# Patient Record
Sex: FEMALE | Race: WHITE | Employment: FULL TIME | URBAN - METROPOLITAN AREA
[De-identification: names, ages, dates, MRNs, and addresses within clinical notes are randomized per-mention and may not be internally consistent; named-entity substitution may affect disease eponyms.]

---

## 2020-01-10 ENCOUNTER — HOSPITAL ENCOUNTER (OUTPATIENT)
Age: 60
Setting detail: SPECIMEN
Discharge: HOME | End: 2020-01-10
Payer: COMMERCIAL

## 2020-01-10 ENCOUNTER — OFFICE VISIT (OUTPATIENT)
Dept: FAMILY MEDICINE CLINIC | Age: 60
End: 2020-01-10
Payer: COMMERCIAL

## 2020-01-10 VITALS
HEIGHT: 67 IN | DIASTOLIC BLOOD PRESSURE: 64 MMHG | TEMPERATURE: 98.8 F | OXYGEN SATURATION: 95 % | HEART RATE: 77 BPM | BODY MASS INDEX: 28.09 KG/M2 | SYSTOLIC BLOOD PRESSURE: 118 MMHG | WEIGHT: 179 LBS

## 2020-01-10 LAB
ALBUMIN SERPL-MCNC: 4.3 G/DL (ref 3.5–5.2)
ALBUMIN/GLOBULIN RATIO: 1.3 (ref 1–2.5)
ALP BLD-CCNC: 69 U/L (ref 35–104)
ALT SERPL-CCNC: 5 U/L (ref 5–33)
ANION GAP SERPL CALCULATED.3IONS-SCNC: 16 MMOL/L (ref 9–17)
AST SERPL-CCNC: 15 U/L
BILIRUB SERPL-MCNC: 0.4 MG/DL (ref 0.3–1.2)
BUN BLDV-MCNC: 19 MG/DL (ref 6–20)
BUN/CREAT BLD: NORMAL (ref 9–20)
CALCIUM SERPL-MCNC: 9.2 MG/DL (ref 8.6–10.4)
CHLORIDE BLD-SCNC: 103 MMOL/L (ref 98–107)
CHOLESTEROL, FASTING: 159 MG/DL
CHOLESTEROL/HDL RATIO: 2
CO2: 24 MMOL/L (ref 20–31)
CREAT SERPL-MCNC: 0.52 MG/DL (ref 0.5–0.9)
GFR AFRICAN AMERICAN: >60 ML/MIN
GFR NON-AFRICAN AMERICAN: >60 ML/MIN
GFR SERPL CREATININE-BSD FRML MDRD: NORMAL ML/MIN/{1.73_M2}
GFR SERPL CREATININE-BSD FRML MDRD: NORMAL ML/MIN/{1.73_M2}
GLUCOSE BLD-MCNC: 83 MG/DL (ref 70–99)
HCT VFR BLD CALC: 42.8 % (ref 36.3–47.1)
HDLC SERPL-MCNC: 79 MG/DL
HEMOGLOBIN: 14.2 G/DL (ref 11.9–15.1)
LDL CHOLESTEROL: 72 MG/DL (ref 0–130)
MCH RBC QN AUTO: 32.1 PG (ref 25.2–33.5)
MCHC RBC AUTO-ENTMCNC: 33.2 G/DL (ref 28.4–34.8)
MCV RBC AUTO: 96.8 FL (ref 82.6–102.9)
NRBC AUTOMATED: 0 PER 100 WBC
PDW BLD-RTO: 12.5 % (ref 11.8–14.4)
PLATELET # BLD: 251 K/UL (ref 138–453)
PMV BLD AUTO: 10.2 FL (ref 8.1–13.5)
POTASSIUM SERPL-SCNC: 4.1 MMOL/L (ref 3.7–5.3)
RBC # BLD: 4.42 M/UL (ref 3.95–5.11)
SODIUM BLD-SCNC: 143 MMOL/L (ref 135–144)
THYROXINE, FREE: 1.43 NG/DL (ref 0.93–1.7)
TOTAL PROTEIN: 7.5 G/DL (ref 6.4–8.3)
TRIGLYCERIDE, FASTING: 40 MG/DL
TSH SERPL DL<=0.05 MIU/L-ACNC: 0.64 MIU/L (ref 0.3–5)
VLDLC SERPL CALC-MCNC: NORMAL MG/DL (ref 1–30)
WBC # BLD: 6 K/UL (ref 3.5–11.3)

## 2020-01-10 PROCEDURE — 99396 PREV VISIT EST AGE 40-64: CPT | Performed by: NURSE PRACTITIONER

## 2020-01-10 SDOH — ECONOMIC STABILITY: TRANSPORTATION INSECURITY
IN THE PAST 12 MONTHS, HAS LACK OF TRANSPORTATION KEPT YOU FROM MEETINGS, WORK, OR FROM GETTING THINGS NEEDED FOR DAILY LIVING?: PATIENT DECLINED

## 2020-01-10 SDOH — ECONOMIC STABILITY: FOOD INSECURITY: WITHIN THE PAST 12 MONTHS, YOU WORRIED THAT YOUR FOOD WOULD RUN OUT BEFORE YOU GOT MONEY TO BUY MORE.: PATIENT DECLINED

## 2020-01-10 SDOH — ECONOMIC STABILITY: FOOD INSECURITY: WITHIN THE PAST 12 MONTHS, THE FOOD YOU BOUGHT JUST DIDN'T LAST AND YOU DIDN'T HAVE MONEY TO GET MORE.: PATIENT DECLINED

## 2020-01-10 SDOH — ECONOMIC STABILITY: TRANSPORTATION INSECURITY
IN THE PAST 12 MONTHS, HAS THE LACK OF TRANSPORTATION KEPT YOU FROM MEDICAL APPOINTMENTS OR FROM GETTING MEDICATIONS?: PATIENT DECLINED

## 2020-01-10 SDOH — ECONOMIC STABILITY: INCOME INSECURITY: HOW HARD IS IT FOR YOU TO PAY FOR THE VERY BASICS LIKE FOOD, HOUSING, MEDICAL CARE, AND HEATING?: PATIENT DECLINED

## 2020-01-10 ASSESSMENT — PATIENT HEALTH QUESTIONNAIRE - PHQ9
2. FEELING DOWN, DEPRESSED OR HOPELESS: 0
SUM OF ALL RESPONSES TO PHQ QUESTIONS 1-9: 0
1. LITTLE INTEREST OR PLEASURE IN DOING THINGS: 0
SUM OF ALL RESPONSES TO PHQ QUESTIONS 1-9: 0
SUM OF ALL RESPONSES TO PHQ9 QUESTIONS 1 & 2: 0

## 2020-01-10 NOTE — PROGRESS NOTES
GRIS Puentes-MANSI  [address]  [address] [facility]. Sudha Hinds 79  [phone]  [phone]    Alessandra Hu is a 63 y.o. female who is here with c/o of:    Chief Complaint: Check-Up (was Dr. Pantera Wu patient )      Patient Accompanied by:     ALEX - Alessandra Hu is here today to establish care and has no complaints          There is no problem list on file for this patient. No past medical history on file. No past surgical history on file. No family history on file. Social History     Tobacco Use    Smoking status: Former Smoker     Packs/day: 1.00     Years: 0.00     Pack years: 0.00     Start date: 2/24/1989    Smokeless tobacco: Never Used   Substance Use Topics    Alcohol use: Not on file     ALLERGIES:  No Known Allergies       Subjective     · Constitutional:  Negative for activity change, appetite change,unexpected weight change, chills, fever, and fatigue. · HENT: Negative for ear pain, sore throat,  Rhinorrhea, sinus pain, sinus pressure, congestion. · Eyes:  Negative for pain and discharge. · Respiratory:  Negative for chest tightness, shortness of breath, wheezing, and cough. · Cardiovascular:  Negative for chest pain, palpitations and leg swelling. · Gastrointestinal: Negative for abdominal pain, blood in stool, constipation,diarrhea, nausea and vomiting. · Endocrine: Negative for cold intolerance, heat intolerance, polydipsia, polyphagia and polyuria. · Genitourinary: Negative for difficulty urinating, dysuria, flank pain, frequency, hematuria and urgency. · Musculoskeletal: Negative for arthralgias, back pain, joint swelling, myalgias, neck pain and neck stiffness. · Skin: Negative for rash and wound. · Allergic/Immunologic: Negative for environmental allergies and food allergies. · Neurological:  Negative for dizziness, light-headedness, numbness and headaches. · Hematological:  Negative for adenopathy.  Does not 118/64, pulse 77, temperature 98.8 °F (37.1 °C), temperature source Temporal, height 5' 7\" (1.702 m), weight 179 lb (81.2 kg), SpO2 95 %. Body mass index is 28.04 kg/m². Wt Readings from Last 3 Encounters:   01/10/20 179 lb (81.2 kg)   02/24/15 189 lb (85.7 kg)   12/03/14 189 lb 6.4 oz (85.9 kg)     BP Readings from Last 3 Encounters:   01/10/20 118/64   02/24/15 132/70   12/03/14 116/80       No results found for this visit on 01/10/20. Completed Orders/Prescriptions   No orders of the defined types were placed in this encounter. AssessmentPlan/Medical Decision Making     1. Encounter for well adult exam without abnormal findings  - CBC; Future  - Comprehensive Metabolic Panel; Future    2. Screening cholesterol level  - Lipid, Fasting; Future    3. Thyroid disorder screening  - TSH; Future  - T4, Free; Future    4. Encounter to establish care with new doctor      Return in about 1 year (around 1/10/2021) for wellness check. 1.  Diaz Edwards received counseling on the following healthy behaviors: nutrition, exercise and medication adherence  2. Patient given educational materials - see patient instructions  3. Was a self-tracking handout given in paper form or via [employer]? No  If yes, see orders or list here. 4.  Discussed use, benefit, and side effects of prescribed medications. Barriers to medication compliance addressed. All patient questions answered. Pt voiced understanding. 5.  Reviewed prior labs, imaging, consultation, follow up, and health maintenance  6. Continue current medications, diet and exercise. 7. Discussed use, benefit, and side effects of prescribed medications. Barriers to medication compliance addressed. All her questions were answered. Pt voiced understanding. Diaz Edwards will continue current medications, diet and exercise.     Patient given educational materials on healthy diet and exercise    Of the 30 minute duration appointment visit, Mercy Salguero CNP spent at least 50% of the face-to-face time in counseling, explanation of diagnosis, planning of further management, and answering all questions. Signed:  Juan Morales CNP    This note is created with the assistance of a speech-recognition program.  While intending to generate a document that actually reflects the content of the visit, no guarantees can be provided that every mistake has been identified and corrected by editing.

## 2020-10-14 ENCOUNTER — NURSE ONLY (OUTPATIENT)
Dept: FAMILY MEDICINE CLINIC | Age: 60
End: 2020-10-14

## 2023-07-13 DIAGNOSIS — I10 PRIMARY HYPERTENSION: Primary | ICD-10-CM

## 2023-07-13 DIAGNOSIS — K21.9 GASTROESOPHAGEAL REFLUX DISEASE WITHOUT ESOPHAGITIS: ICD-10-CM

## 2023-07-13 RX ORDER — AMLODIPINE BESYLATE 5 MG/1
5 TABLET ORAL DAILY
COMMUNITY
Start: 2023-04-15 | End: 2023-07-17 | Stop reason: SDUPTHER

## 2023-07-17 PROBLEM — K21.9 GASTROESOPHAGEAL REFLUX DISEASE WITHOUT ESOPHAGITIS: Status: ACTIVE | Noted: 2023-07-17

## 2023-07-17 RX ORDER — MULTIVIT-MIN/IRON/FOLIC ACID/K 18-600-40
2 CAPSULE ORAL DAILY
COMMUNITY

## 2023-07-17 RX ORDER — ALBUTEROL SULFATE 90 UG/1
2 AEROSOL, METERED RESPIRATORY (INHALATION) EVERY 6 HOURS PRN
COMMUNITY

## 2023-07-17 RX ORDER — AMLODIPINE BESYLATE 5 MG/1
5 TABLET ORAL DAILY
Qty: 30 TABLET | Refills: 2 | Status: SHIPPED | OUTPATIENT
Start: 2023-07-17 | End: 2023-10-15

## 2023-07-17 RX ORDER — LISINOPRIL 40 MG/1
40 TABLET ORAL DAILY
COMMUNITY
Start: 2023-07-10

## 2023-07-17 RX ORDER — UBIDECARENONE 30 MG
1 CAPSULE ORAL DAILY
COMMUNITY

## 2023-07-17 RX ORDER — OMEPRAZOLE 20 MG/1
20 CAPSULE, DELAYED RELEASE ORAL
COMMUNITY
Start: 2023-06-14

## 2023-08-21 ENCOUNTER — OFFICE VISIT (OUTPATIENT)
Age: 63
End: 2023-08-21
Payer: COMMERCIAL

## 2023-08-21 VITALS
DIASTOLIC BLOOD PRESSURE: 56 MMHG | HEART RATE: 72 BPM | BODY MASS INDEX: 30.92 KG/M2 | SYSTOLIC BLOOD PRESSURE: 132 MMHG | RESPIRATION RATE: 12 BRPM | WEIGHT: 197.4 LBS

## 2023-08-21 DIAGNOSIS — M25.561 CHRONIC PAIN OF BOTH KNEES: ICD-10-CM

## 2023-08-21 DIAGNOSIS — E66.09 CLASS 1 OBESITY DUE TO EXCESS CALORIES WITH SERIOUS COMORBIDITY AND BODY MASS INDEX (BMI) OF 30.0 TO 30.9 IN ADULT: ICD-10-CM

## 2023-08-21 DIAGNOSIS — M70.72 BURSITIS OF BOTH HIPS, UNSPECIFIED BURSA: ICD-10-CM

## 2023-08-21 DIAGNOSIS — M25.562 CHRONIC PAIN OF BOTH KNEES: ICD-10-CM

## 2023-08-21 DIAGNOSIS — I10 ESSENTIAL HYPERTENSION: Primary | ICD-10-CM

## 2023-08-21 DIAGNOSIS — G89.29 CHRONIC PAIN OF BOTH KNEES: ICD-10-CM

## 2023-08-21 DIAGNOSIS — M70.71 BURSITIS OF BOTH HIPS, UNSPECIFIED BURSA: ICD-10-CM

## 2023-08-21 DIAGNOSIS — Z87.891 FORMER SMOKER, STOPPED SMOKING IN DISTANT PAST: ICD-10-CM

## 2023-08-21 PROBLEM — G47.33 OSA (OBSTRUCTIVE SLEEP APNEA): Status: ACTIVE | Noted: 2023-08-21

## 2023-08-21 PROBLEM — G47.33 OSA (OBSTRUCTIVE SLEEP APNEA): Status: ACTIVE | Noted: 2023-03-02

## 2023-08-21 PROCEDURE — 3074F SYST BP LT 130 MM HG: CPT | Performed by: STUDENT IN AN ORGANIZED HEALTH CARE EDUCATION/TRAINING PROGRAM

## 2023-08-21 PROCEDURE — 3078F DIAST BP <80 MM HG: CPT | Performed by: STUDENT IN AN ORGANIZED HEALTH CARE EDUCATION/TRAINING PROGRAM

## 2023-08-21 PROCEDURE — 99213 OFFICE O/P EST LOW 20 MIN: CPT | Performed by: STUDENT IN AN ORGANIZED HEALTH CARE EDUCATION/TRAINING PROGRAM

## 2023-08-21 RX ORDER — ACETAMINOPHEN 160 MG
1 TABLET,DISINTEGRATING ORAL DAILY
COMMUNITY

## 2023-08-21 SDOH — ECONOMIC STABILITY: FOOD INSECURITY: WITHIN THE PAST 12 MONTHS, THE FOOD YOU BOUGHT JUST DIDN'T LAST AND YOU DIDN'T HAVE MONEY TO GET MORE.: NEVER TRUE

## 2023-08-21 SDOH — ECONOMIC STABILITY: FOOD INSECURITY: WITHIN THE PAST 12 MONTHS, YOU WORRIED THAT YOUR FOOD WOULD RUN OUT BEFORE YOU GOT MONEY TO BUY MORE.: NEVER TRUE

## 2023-08-21 SDOH — ECONOMIC STABILITY: HOUSING INSECURITY
IN THE LAST 12 MONTHS, WAS THERE A TIME WHEN YOU DID NOT HAVE A STEADY PLACE TO SLEEP OR SLEPT IN A SHELTER (INCLUDING NOW)?: NO

## 2023-08-21 SDOH — ECONOMIC STABILITY: INCOME INSECURITY: HOW HARD IS IT FOR YOU TO PAY FOR THE VERY BASICS LIKE FOOD, HOUSING, MEDICAL CARE, AND HEATING?: NOT HARD AT ALL

## 2023-08-21 ASSESSMENT — PATIENT HEALTH QUESTIONNAIRE - PHQ9
2. FEELING DOWN, DEPRESSED OR HOPELESS: 0
SUM OF ALL RESPONSES TO PHQ QUESTIONS 1-9: 0
SUM OF ALL RESPONSES TO PHQ9 QUESTIONS 1 & 2: 0
1. LITTLE INTEREST OR PLEASURE IN DOING THINGS: 0
SUM OF ALL RESPONSES TO PHQ QUESTIONS 1-9: 0

## 2023-08-21 NOTE — PROGRESS NOTES
Neurological:      Mental Status: She is alert. LABS   No labs since 2020 in epic. No results found for this visit on 08/21/23. COMMUNICATION   Questions/concerns answered. Patient verbalized and expressed understanding. Medications, laboratory testing, imaging, consultation, and follow up as documented in this encounter. Patient Instructions   Thank you for following up with us at [address] [place] [address]. office! It was a pleasure to meet you today! Our plan is the following:  - I want you to use over the counter Tylenol Extra strength (the arthritis strength one), or any generic arthritis compound at the drugstore. You can take 650mg in the morning and 650mg in the evening.     - I'm going to give you some stretches to do to help loosen the muscles in the front of your thighs. If you can only stretch once a day, make sure that one time is before bed. Your medication list is included in the after visit summary. If you find any differences when compared to your medications at home, please contact the office ([phone]) to let us know. You can also call the office if you have any additional questions or concerns and speak to one of the staff. They will triage and forward the message to the provider. Have a great rest of your day! BMI was elevated today, and weight loss plan recommended is : daily exercise regimen.     Please be aware portions of this note were completed using voice recognition software and unforeseen errors may have occurred    Patient was seen and discussed with Domenica Grant MD.  Electronically signed by Jeane Toribio MD on 8/21/2023 at 12:30 PM

## 2023-08-21 NOTE — PATIENT INSTRUCTIONS
Thank you for following up with us at [address] [facility]. office! It was a pleasure to meet you today! Our plan is the following:  - I want you to use over the counter Tylenol Extra strength (the arthritis strength one), or any generic arthritis compound at the drugstore. You can take 650mg in the morning and 650mg in the evening.     - I'm going to give you some stretches to do to help loosen the muscles in the front of your thighs. If you can only stretch once a day, make sure that one time is before bed. Your medication list is included in the after visit summary. If you find any differences when compared to your medications at home, please contact the office ([phone]) to let us know. You can also call the office if you have any additional questions or concerns and speak to one of the staff. They will triage and forward the message to the provider. Have a great rest of your day!

## 2023-10-25 DIAGNOSIS — I10 PRIMARY HYPERTENSION: ICD-10-CM

## 2023-10-25 RX ORDER — AMLODIPINE BESYLATE 5 MG/1
5 TABLET ORAL DAILY
Qty: 90 TABLET | Refills: 3 | Status: SHIPPED | OUTPATIENT
Start: 2023-10-25 | End: 2024-10-19

## 2023-11-01 ENCOUNTER — OFFICE VISIT (OUTPATIENT)
Age: 63
End: 2023-11-01
Payer: COMMERCIAL

## 2023-11-01 ENCOUNTER — HOSPITAL ENCOUNTER (OUTPATIENT)
Age: 63
Setting detail: SPECIMEN
Discharge: HOME | End: 2023-11-01

## 2023-11-01 VITALS
DIASTOLIC BLOOD PRESSURE: 55 MMHG | BODY MASS INDEX: 31.04 KG/M2 | HEART RATE: 80 BPM | WEIGHT: 198.2 LBS | RESPIRATION RATE: 14 BRPM | SYSTOLIC BLOOD PRESSURE: 136 MMHG

## 2023-11-01 DIAGNOSIS — Z12.11 COLON CANCER SCREENING: ICD-10-CM

## 2023-11-01 DIAGNOSIS — Z23 FLU VACCINE NEED: ICD-10-CM

## 2023-11-01 DIAGNOSIS — Z12.31 ENCOUNTER FOR SCREENING MAMMOGRAM FOR BREAST CANCER: ICD-10-CM

## 2023-11-01 DIAGNOSIS — Z87.891 FORMER SMOKER, STOPPED SMOKING IN DISTANT PAST: ICD-10-CM

## 2023-11-01 DIAGNOSIS — Z12.4 CERVICAL CANCER SCREENING: ICD-10-CM

## 2023-11-01 DIAGNOSIS — Z01.419 ENCOUNTER FOR WELL WOMAN EXAM WITH ROUTINE GYNECOLOGICAL EXAM: Primary | ICD-10-CM

## 2023-11-01 PROCEDURE — 3078F DIAST BP <80 MM HG: CPT | Performed by: STUDENT IN AN ORGANIZED HEALTH CARE EDUCATION/TRAINING PROGRAM

## 2023-11-01 PROCEDURE — 3074F SYST BP LT 130 MM HG: CPT | Performed by: STUDENT IN AN ORGANIZED HEALTH CARE EDUCATION/TRAINING PROGRAM

## 2023-11-01 PROCEDURE — 90686 IIV4 VACC NO PRSV 0.5 ML IM: CPT | Performed by: FAMILY MEDICINE

## 2023-11-01 PROCEDURE — 99211 OFF/OP EST MAY X REQ PHY/QHP: CPT | Performed by: STUDENT IN AN ORGANIZED HEALTH CARE EDUCATION/TRAINING PROGRAM

## 2023-11-01 PROCEDURE — 99396 PREV VISIT EST AGE 40-64: CPT | Performed by: STUDENT IN AN ORGANIZED HEALTH CARE EDUCATION/TRAINING PROGRAM

## 2023-11-01 NOTE — PROGRESS NOTES
[address] [facility]. S.W Family Medicine Residency  [address] [facility], [facility]  Phone: [phone]  Fax: [phone]    Date of Visit: 23  Patient Name: Janelle Reeves   Patient :  1960     ASSESSMENT/PLAN   Janelle Reeves is a 59 y.o. female who is here for a complete physical exam. Preventative screenings reviewed with patient today. Patient is due for her flu shot, a mammogram, a colonoscopy, and a pap smear today. Overall she is doing well. 1. Encounter for well woman exam with routine gynecological exam  Comments:  - catching up on screenings, but no concerns today  2. Cervical cancer screening  Comments:  - pt has MyChart, so will send message via [employer] if results are negative/normal  Orders:  -     PAP SMEAR; Future  3. Encounter for screening mammogram for breast cancer  -     [facility] MOR DIGITAL SCREEN BILATERAL; Future  4. Colon cancer screening  -     [facility], [facility] Mario Arevalo DO, Gastroenterology, [facility]  5. Flu vaccine need  -     Influenza, FLUARIX, (age 10 mo+),  IM, PF, 0.5 mL  6. Former smoker, stopped smoking in distant past  Assessment & Plan:  - quit smoking in  after 11 years, so does not meet criteria for LDCT       Return in about 4 months (around 3/1/2024) for routine HTN follow-up. HPI   Janelle Reeves is a 59 y.o. female for an annual wellness exam.    Subjective:  She has been feeling fine and has had no new problems since last office visit. REVIEWED INFORMATION    I have personally reviewed the medications, PMH, PSH, FH, allergies and social history.     Past Medical History:   Diagnosis Date    Former smoker     GERD (gastroesophageal reflux disease)     HTN (hypertension)     Pneumonia     Sleep apnea 2022    Wears glasses      Past Surgical History:   Procedure Laterality Date    BUNIONECTOMY Right 2015    FOOT FRACTURE SURGERY Left 1967    TONSILLECTOMY      as a child    [facility] NEEDLE ASPIRATION Right      Patient

## 2023-11-01 NOTE — PATIENT INSTRUCTIONS
Thank you for following up with us at [address] [facility]. office! It was a pleasure to meet you today! Our plan is the following:  - you'll receive a message in [employer] [facility] if your pap results come back normal. If there's anything abnormal with your results, you'll receive a call from the office.    - I have generated a referral for GI. If you have not heard from them in 4 business days, you can call the number on the order sheet and schedule an appointment to get established. If you have any issues getting scheduled, please call the office to let me know. Your medication list is included in the after visit summary. If you find any differences when compared to your medications at home, please contact the office ([phone]) to let us know. You can also call the office if you have any additional questions or concerns and speak to one of the staff. They will triage and forward the message to the provider. Have a great rest of your day!

## 2023-11-02 DIAGNOSIS — Z12.4 CERVICAL CANCER SCREENING: ICD-10-CM

## 2023-11-03 LAB
HPV I/H RISK 4 DNA CVX QL NAA+PROBE: NOT DETECTED
HPV SAMPLE: NORMAL
HPV, INTERPRETATION: NORMAL
HPV16 DNA CVX QL NAA+PROBE: NOT DETECTED
HPV18 DNA CVX QL NAA+PROBE: NOT DETECTED
SPECIMEN DESCRIPTION: NORMAL

## 2023-11-06 ENCOUNTER — HOSPITAL ENCOUNTER (OUTPATIENT)
Dept: MAMMOGRAPHY | Age: 63
Discharge: HOME | End: 2023-11-08
Payer: COMMERCIAL

## 2023-11-06 VITALS — HEIGHT: 67 IN | BODY MASS INDEX: 31.08 KG/M2 | WEIGHT: 198 LBS

## 2023-11-06 DIAGNOSIS — Z12.31 ENCOUNTER FOR SCREENING MAMMOGRAM FOR BREAST CANCER: ICD-10-CM

## 2023-11-06 PROCEDURE — 77063 BREAST TOMOSYNTHESIS BI: CPT

## 2023-11-16 LAB — CYTOLOGY REPORT: NORMAL

## 2024-01-09 DIAGNOSIS — I10 ESSENTIAL HYPERTENSION: Primary | ICD-10-CM

## 2024-01-09 DIAGNOSIS — K21.9 GASTROESOPHAGEAL REFLUX DISEASE WITHOUT ESOPHAGITIS: ICD-10-CM

## 2024-01-12 RX ORDER — LISINOPRIL 40 MG/1
40 TABLET ORAL DAILY
Qty: 90 TABLET | Refills: 1 | Status: SHIPPED | OUTPATIENT
Start: 2024-01-12

## 2024-02-21 ENCOUNTER — OFFICE VISIT (OUTPATIENT)
Age: 64
End: 2024-02-21
Payer: COMMERCIAL

## 2024-02-21 VITALS
DIASTOLIC BLOOD PRESSURE: 69 MMHG | WEIGHT: 198 LBS | HEART RATE: 76 BPM | SYSTOLIC BLOOD PRESSURE: 136 MMHG | BODY MASS INDEX: 31.01 KG/M2 | RESPIRATION RATE: 12 BRPM

## 2024-02-21 DIAGNOSIS — Z87.891 FORMER SMOKER, STOPPED SMOKING IN DISTANT PAST: ICD-10-CM

## 2024-02-21 DIAGNOSIS — R49.0 DYSPHONIA: ICD-10-CM

## 2024-02-21 DIAGNOSIS — J01.40 ACUTE NON-RECURRENT PANSINUSITIS: Primary | ICD-10-CM

## 2024-02-21 DIAGNOSIS — R09.81 NASAL CONGESTION: ICD-10-CM

## 2024-02-21 DIAGNOSIS — J34.89 RHINORRHEA: ICD-10-CM

## 2024-02-21 DIAGNOSIS — E66.09 CLASS 1 OBESITY DUE TO EXCESS CALORIES WITH SERIOUS COMORBIDITY AND BODY MASS INDEX (BMI) OF 31.0 TO 31.9 IN ADULT: ICD-10-CM

## 2024-02-21 PROCEDURE — 99211 OFF/OP EST MAY X REQ PHY/QHP: CPT | Performed by: STUDENT IN AN ORGANIZED HEALTH CARE EDUCATION/TRAINING PROGRAM

## 2024-02-21 RX ORDER — DOXYCYCLINE HYCLATE 100 MG
100 TABLET ORAL 2 TIMES DAILY
Qty: 14 TABLET | Refills: 0 | Status: SHIPPED | OUTPATIENT
Start: 2024-02-21 | End: 2024-02-28

## 2024-02-21 SDOH — SOCIAL STABILITY: SOCIAL NETWORK
DO YOU BELONG TO ANY CLUBS OR ORGANIZATIONS SUCH AS CHURCH GROUPS UNIONS, FRATERNAL OR ATHLETIC GROUPS, OR SCHOOL GROUPS?: YES

## 2024-02-21 SDOH — ECONOMIC STABILITY: INCOME INSECURITY: HOW HARD IS IT FOR YOU TO PAY FOR THE VERY BASICS LIKE FOOD, HOUSING, MEDICAL CARE, AND HEATING?: NOT HARD AT ALL

## 2024-02-21 SDOH — SOCIAL STABILITY: SOCIAL NETWORK: HOW OFTEN DO YOU GET TOGETHER WITH FRIENDS OR RELATIVES?: TWICE A WEEK

## 2024-02-21 SDOH — ECONOMIC STABILITY: INCOME INSECURITY: IN THE LAST 12 MONTHS, WAS THERE A TIME WHEN YOU WERE NOT ABLE TO PAY THE MORTGAGE OR RENT ON TIME?: NO

## 2024-02-21 SDOH — SOCIAL STABILITY: SOCIAL INSECURITY
WITHIN THE LAST YEAR, HAVE YOU BEEN KICKED, HIT, SLAPPED, OR OTHERWISE PHYSICALLY HURT BY YOUR PARTNER OR EX-PARTNER?: NO

## 2024-02-21 SDOH — ECONOMIC STABILITY: TRANSPORTATION INSECURITY
IN THE PAST 12 MONTHS, HAS THE LACK OF TRANSPORTATION KEPT YOU FROM MEDICAL APPOINTMENTS OR FROM GETTING MEDICATIONS?: NO

## 2024-02-21 SDOH — ECONOMIC STABILITY: HOUSING INSECURITY: IN THE LAST 12 MONTHS, HOW MANY PLACES HAVE YOU LIVED?: 1

## 2024-02-21 SDOH — SOCIAL STABILITY: SOCIAL NETWORK: HOW OFTEN DO YOU ATTEND CHURCH OR RELIGIOUS SERVICES?: NEVER

## 2024-02-21 SDOH — SOCIAL STABILITY: SOCIAL INSECURITY: WITHIN THE LAST YEAR, HAVE YOU BEEN AFRAID OF YOUR PARTNER OR EX-PARTNER?: NO

## 2024-02-21 SDOH — ECONOMIC STABILITY: TRANSPORTATION INSECURITY
IN THE PAST 12 MONTHS, HAS LACK OF TRANSPORTATION KEPT YOU FROM MEETINGS, WORK, OR FROM GETTING THINGS NEEDED FOR DAILY LIVING?: NO

## 2024-02-21 SDOH — SOCIAL STABILITY: SOCIAL NETWORK: ARE YOU MARRIED, WIDOWED, DIVORCED, SEPARATED, NEVER MARRIED, OR LIVING WITH A PARTNER?: MARRIED

## 2024-02-21 SDOH — SOCIAL STABILITY: SOCIAL INSECURITY
WITHIN THE LAST YEAR, HAVE TO BEEN RAPED OR FORCED TO HAVE ANY KIND OF SEXUAL ACTIVITY BY YOUR PARTNER OR EX-PARTNER?: NO

## 2024-02-21 SDOH — HEALTH STABILITY: PHYSICAL HEALTH: ON AVERAGE, HOW MANY DAYS PER WEEK DO YOU ENGAGE IN MODERATE TO STRENUOUS EXERCISE (LIKE A BRISK WALK)?: 0 DAYS

## 2024-02-21 SDOH — HEALTH STABILITY: MENTAL HEALTH: HOW OFTEN DO YOU HAVE A DRINK CONTAINING ALCOHOL?: 2-4 TIMES A MONTH

## 2024-02-21 SDOH — SOCIAL STABILITY: SOCIAL NETWORK: HOW OFTEN DO YOU ATTENT MEETINGS OF THE CLUB OR ORGANIZATION YOU BELONG TO?: MORE THAN 4 TIMES PER YEAR

## 2024-02-21 SDOH — SOCIAL STABILITY: SOCIAL INSECURITY: WITHIN THE LAST YEAR, HAVE YOU BEEN HUMILIATED OR EMOTIONALLY ABUSED IN OTHER WAYS BY YOUR PARTNER OR EX-PARTNER?: NO

## 2024-02-21 SDOH — ECONOMIC STABILITY: FOOD INSECURITY: WITHIN THE PAST 12 MONTHS, THE FOOD YOU BOUGHT JUST DIDN'T LAST AND YOU DIDN'T HAVE MONEY TO GET MORE.: NEVER TRUE

## 2024-02-21 SDOH — HEALTH STABILITY: PHYSICAL HEALTH: ON AVERAGE, HOW MANY MINUTES DO YOU ENGAGE IN EXERCISE AT THIS LEVEL?: 0 MIN

## 2024-02-21 SDOH — SOCIAL STABILITY: SOCIAL NETWORK
IN A TYPICAL WEEK, HOW MANY TIMES DO YOU TALK ON THE PHONE WITH FAMILY, FRIENDS, OR NEIGHBORS?: MORE THAN THREE TIMES A WEEK

## 2024-02-21 SDOH — HEALTH STABILITY: MENTAL HEALTH: HOW MANY STANDARD DRINKS CONTAINING ALCOHOL DO YOU HAVE ON A TYPICAL DAY?: 1 OR 2

## 2024-02-21 SDOH — HEALTH STABILITY: MENTAL HEALTH
STRESS IS WHEN SOMEONE FEELS TENSE, NERVOUS, ANXIOUS, OR CAN'T SLEEP AT NIGHT BECAUSE THEIR MIND IS TROUBLED. HOW STRESSED ARE YOU?: NOT AT ALL

## 2024-02-21 SDOH — ECONOMIC STABILITY: FOOD INSECURITY: WITHIN THE PAST 12 MONTHS, YOU WORRIED THAT YOUR FOOD WOULD RUN OUT BEFORE YOU GOT MONEY TO BUY MORE.: NEVER TRUE

## 2024-02-21 ASSESSMENT — PATIENT HEALTH QUESTIONNAIRE - PHQ9
SUM OF ALL RESPONSES TO PHQ QUESTIONS 1-9: 0
SUM OF ALL RESPONSES TO PHQ QUESTIONS 1-9: 0
2. FEELING DOWN, DEPRESSED OR HOPELESS: 0
1. LITTLE INTEREST OR PLEASURE IN DOING THINGS: 0
SUM OF ALL RESPONSES TO PHQ QUESTIONS 1-9: 0
SUM OF ALL RESPONSES TO PHQ QUESTIONS 1-9: 0
SUM OF ALL RESPONSES TO PHQ9 QUESTIONS 1 & 2: 0

## 2024-02-21 NOTE — PATIENT INSTRUCTIONS
benefit of maintaining hydration and soothing your throat.  Nasal congestion: take mucinex (expectorant). Using a neti-pot or nasal saline rinses (make sure you use distilled, sterile, or previously boiled water) can help flush out your sinuses and calm down inflammation. Turning on a hot shower with the door closed increases the humidity, and breathing in the humid air is a natural method that also helps.   Runny nose: Flonase nasal spray (steroid nasal spray) or allegra (anti-histamine). And it may sound counterintuitive, using a neti-pot or nasal saline rinse can help get rid of a runny nose. If you do use a nasal rinse, make sure to use the flonase nasal spray AFTER the nasal rinse.  Cough: drink hot teas with honey which is a natural antitussive/antiseptic  (please do not use honey in children under the age of 1 year). You can also use robitussin (an antitussive or cough suppressant), or cough drops.   If you experience any shortness of breath, chest pain, or fevers >104 degF, please go to the emergency department for immediate evaluation.     Your medication list is included in the after visit summary. If you find any differences when compared to your medications at home, please contact the office ([phone]) to let us know.   You can also call the office if you have any additional questions or concerns and speak to one of the staff. They will triage and forward the message to the provider.   Have a great rest of your day!

## 2024-03-20 ENCOUNTER — OFFICE VISIT (OUTPATIENT)
Age: 64
End: 2024-03-20
Payer: COMMERCIAL

## 2024-03-20 ENCOUNTER — HOSPITAL ENCOUNTER (OUTPATIENT)
Age: 64
Setting detail: SPECIMEN
Discharge: HOME | End: 2024-03-20

## 2024-03-20 VITALS
BODY MASS INDEX: 31.76 KG/M2 | RESPIRATION RATE: 12 BRPM | WEIGHT: 202.8 LBS | HEART RATE: 80 BPM | DIASTOLIC BLOOD PRESSURE: 62 MMHG | SYSTOLIC BLOOD PRESSURE: 142 MMHG

## 2024-03-20 DIAGNOSIS — E66.09 CLASS 1 OBESITY DUE TO EXCESS CALORIES WITH SERIOUS COMORBIDITY AND BODY MASS INDEX (BMI) OF 31.0 TO 31.9 IN ADULT: ICD-10-CM

## 2024-03-20 DIAGNOSIS — Z87.891 FORMER SMOKER, STOPPED SMOKING IN DISTANT PAST: ICD-10-CM

## 2024-03-20 DIAGNOSIS — Z11.59 NEED FOR HEPATITIS C SCREENING TEST: ICD-10-CM

## 2024-03-20 DIAGNOSIS — I10 ESSENTIAL HYPERTENSION: Primary | ICD-10-CM

## 2024-03-20 DIAGNOSIS — K21.9 GASTROESOPHAGEAL REFLUX DISEASE WITHOUT ESOPHAGITIS: ICD-10-CM

## 2024-03-20 DIAGNOSIS — G47.33 OSA (OBSTRUCTIVE SLEEP APNEA): ICD-10-CM

## 2024-03-20 DIAGNOSIS — Z11.4 ENCOUNTER FOR SCREENING FOR HIV: ICD-10-CM

## 2024-03-20 DIAGNOSIS — I10 ESSENTIAL HYPERTENSION: ICD-10-CM

## 2024-03-20 LAB
ANION GAP SERPL CALCULATED.3IONS-SCNC: 12 MMOL/L (ref 9–16)
BASOPHILS # BLD: 0.06 K/UL (ref 0–0.2)
BASOPHILS NFR BLD: 1 % (ref 0–2)
BUN SERPL-MCNC: 12 MG/DL (ref 8–23)
CALCIUM SERPL-MCNC: 9.4 MG/DL (ref 8.6–10.4)
CHLORIDE SERPL-SCNC: 105 MMOL/L (ref 98–107)
CHOLEST SERPL-MCNC: 156 MG/DL (ref 0–199)
CHOLESTEROL/HDL RATIO: 2
CO2 SERPL-SCNC: 25 MMOL/L (ref 20–31)
CREAT SERPL-MCNC: 0.6 MG/DL (ref 0.5–0.9)
CREAT UR-MCNC: 120 MG/DL (ref 28–217)
EOSINOPHIL # BLD: 0.25 K/UL (ref 0–0.44)
EOSINOPHILS RELATIVE PERCENT: 3 % (ref 1–4)
ERYTHROCYTE [DISTWIDTH] IN BLOOD BY AUTOMATED COUNT: 11.9 % (ref 11.8–14.4)
GFR SERPL CREATININE-BSD FRML MDRD: >60 ML/MIN/1.73M2
GLUCOSE SERPL-MCNC: 81 MG/DL (ref 74–99)
HCT VFR BLD AUTO: 37.7 % (ref 36.3–47.1)
HCV AB SERPL QL IA: NONREACTIVE
HDLC SERPL-MCNC: 77 MG/DL
HGB BLD-MCNC: 12.2 G/DL (ref 11.9–15.1)
HIV 1+2 AB+HIV1 P24 AG SERPL QL IA: NONREACTIVE
IMM GRANULOCYTES # BLD AUTO: <0.03 K/UL (ref 0–0.3)
IMM GRANULOCYTES NFR BLD: 0 %
LDLC SERPL CALC-MCNC: 70 MG/DL (ref 0–100)
LYMPHOCYTES NFR BLD: 1.71 K/UL (ref 1.1–3.7)
LYMPHOCYTES RELATIVE PERCENT: 23 % (ref 24–43)
MAGNESIUM SERPL-MCNC: 2.1 MG/DL (ref 1.6–2.4)
MCH RBC QN AUTO: 30.4 PG (ref 25.2–33.5)
MCHC RBC AUTO-ENTMCNC: 32.4 G/DL (ref 28.4–34.8)
MCV RBC AUTO: 94 FL (ref 82.6–102.9)
MICROALBUMIN UR-MCNC: <12 MG/L (ref 0–20)
MICROALBUMIN/CREAT UR-RTO: NORMAL MCG/MG CREAT (ref 0–25)
MONOCYTES NFR BLD: 0.42 K/UL (ref 0.1–1.2)
MONOCYTES NFR BLD: 6 % (ref 3–12)
NEUTROPHILS NFR BLD: 67 % (ref 36–65)
NEUTS SEG NFR BLD: 5.11 K/UL (ref 1.5–8.1)
NRBC BLD-RTO: 0 PER 100 WBC
PLATELET # BLD AUTO: 290 K/UL (ref 138–453)
PMV BLD AUTO: 9.7 FL (ref 8.1–13.5)
POTASSIUM SERPL-SCNC: 4.4 MMOL/L (ref 3.7–5.3)
RBC # BLD AUTO: 4.01 M/UL (ref 3.95–5.11)
SODIUM SERPL-SCNC: 142 MMOL/L (ref 136–145)
TRIGL SERPL-MCNC: 49 MG/DL (ref 0–149)
VLDLC SERPL CALC-MCNC: 10 MG/DL
WBC OTHER # BLD: 7.6 K/UL (ref 3.5–11.3)

## 2024-03-20 PROCEDURE — 3077F SYST BP >= 140 MM HG: CPT | Performed by: STUDENT IN AN ORGANIZED HEALTH CARE EDUCATION/TRAINING PROGRAM

## 2024-03-20 PROCEDURE — 3078F DIAST BP <80 MM HG: CPT | Performed by: STUDENT IN AN ORGANIZED HEALTH CARE EDUCATION/TRAINING PROGRAM

## 2024-03-20 PROCEDURE — 99214 OFFICE O/P EST MOD 30 MIN: CPT | Performed by: STUDENT IN AN ORGANIZED HEALTH CARE EDUCATION/TRAINING PROGRAM

## 2024-03-20 NOTE — PATIENT INSTRUCTIONS
Thank you for following up with us at [facility] office! It was a pleasure to meet you today!     Our plan is the following:    - It's been awhile since you've had labwork done, so I've ordered some labs. You can get these done today, or some other time at your convenience, but try to get them done before your annual physical with me.    - If you've been asked to keep track of your blood pressure at home, make sure you are measuring your blood pressure at the same time of day every day you measure it. Blood pressure follows a natural variation every day; it starts off low, rises throughout the day until midday, and then starts to drop again in the late afternoon/evening. It doesn't matter when you measure your blood pressure as long as you measure it at the same time of day each time, so take that into consideration when trying to determine when you want to check your blood pressure. You can check your blood pressure three times weekly, or if you experience any symptoms of high blood pressure (headache, vision changes, brain fog) or low blood pressure (dizziness or lightheadedness).    If you're looking for a new blood pressure cuff, check out the website validateBP.org to find a quality cuff    - I have generated a referral for sleep medicine.  If you have not heard from them in 4 business days, you can call the number on the order sheet and schedule an appointment to get established. If you have any issues getting scheduled, please call the office to let me know.     Your medication list is included in the after visit summary. If you find any differences when compared to your medications at home, please contact the office ([phone]) to let us know.   You can also call the office if you have any additional questions or concerns and speak to one of the staff. They will triage and forward the message to the provider.   Have a great rest of your day!

## 2024-03-20 NOTE — ASSESSMENT & PLAN NOTE
- OTC BP cuff reads SBP 30 over office reading, so prescription for new BP cuff provided.  - HTN labs ordered today. Plan to see pt again in 6-8wks, enough time for pt to get a new BP cuff and obtain readings three times weekly for 1mo

## 2024-03-20 NOTE — ASSESSMENT & PLAN NOTE
- pt has had machine since Oct 2022, has not seen sleep medicine physician since then. Referral generated for Dr. Pace's office for management

## 2024-03-20 NOTE — PROGRESS NOTES
[facility] [place] [facility]  [address] [place] [address]  Phone: [phone]  Fax: [phone]      Date of Visit: 3/20/24  Patient Name: Negra Coburn   Patient :  1960     ASSESSMENT/PLAN   1. Essential hypertension  Assessment & Plan:  - OTC BP cuff reads SBP 30 over office reading, so prescription for new BP cuff provided.  - HTN labs ordered today. Plan to see pt again in 6-8wks, enough time for pt to get a new BP cuff and obtain readings three times weekly for 1mo  Orders:  -     CBC with Auto Differential; Future  -     Microalbumin, Ur; Future  -     Basic Metabolic Panel; Future  -     Lipid, Fasting; Future  -     Blood Pressure Monitoring (COMFORT TOUCH BP CUFF/MEDIUM) MISC; 1 each by Does not apply route three times a week, Disp-1 each, R-0Please size cuff to patient.Print  2. JENNIFER (obstructive sleep apnea)  Assessment & Plan:  - pt has had machine since Oct 2022, has not seen sleep medicine physician since then. Referral generated for Dr. Pace's office for management  Orders:  -     AFL - Chuckie Pace MD, Pulmonology, [place]  3. Gastroesophageal reflux disease without esophagitis  Assessment & Plan:  - no recent mag level; will check with lab draw  Orders:  -     Magnesium; Future  4. Need for hepatitis C screening test  -     Hepatitis C Antibody; Future  5. Encounter for screening for HIV  -     HIV Screen; Future  6. Class 1 obesity due to excess calories with serious comorbidity and body mass index (BMI) of 31.0 to 31.9 in adult  7. Former smoker, stopped smoking in distant past       Return in about 2 months (around 2024) for HTN follow-up, bring in new BP log.    HPI    Negra Coburn is a 64 y.o. female who presents today to discuss   Chief Complaint   Patient presents with    Hypertension     Hypertension:  The pt is here for a follow up evaluation of blood pressure.   Blood pressure medications include Lisinopril,

## 2024-04-22 DIAGNOSIS — K21.9 GASTROESOPHAGEAL REFLUX DISEASE WITHOUT ESOPHAGITIS: Primary | ICD-10-CM

## 2024-04-22 RX ORDER — OMEPRAZOLE 20 MG/1
20 CAPSULE, DELAYED RELEASE ORAL DAILY
Qty: 90 CAPSULE | Refills: 0 | Status: SHIPPED | OUTPATIENT
Start: 2024-04-22

## 2024-06-03 ENCOUNTER — OFFICE VISIT (OUTPATIENT)
Age: 64
End: 2024-06-03
Payer: COMMERCIAL

## 2024-06-03 VITALS
DIASTOLIC BLOOD PRESSURE: 55 MMHG | BODY MASS INDEX: 29.66 KG/M2 | RESPIRATION RATE: 14 BRPM | WEIGHT: 189.4 LBS | HEART RATE: 72 BPM | SYSTOLIC BLOOD PRESSURE: 142 MMHG

## 2024-06-03 DIAGNOSIS — I10 ESSENTIAL HYPERTENSION: Primary | ICD-10-CM

## 2024-06-03 DIAGNOSIS — R09.82 POSTNASAL DRIP: ICD-10-CM

## 2024-06-03 DIAGNOSIS — Z87.891 FORMER SMOKER, STOPPED SMOKING IN DISTANT PAST: ICD-10-CM

## 2024-06-03 DIAGNOSIS — M54.50 ACUTE BILATERAL LOW BACK PAIN WITHOUT SCIATICA: ICD-10-CM

## 2024-06-03 DIAGNOSIS — J30.2 SEASONAL ALLERGIES: ICD-10-CM

## 2024-06-03 DIAGNOSIS — E66.3 OVERWEIGHT (BMI 25.0-29.9): ICD-10-CM

## 2024-06-03 PROCEDURE — 3077F SYST BP >= 140 MM HG: CPT | Performed by: STUDENT IN AN ORGANIZED HEALTH CARE EDUCATION/TRAINING PROGRAM

## 2024-06-03 PROCEDURE — 99211 OFF/OP EST MAY X REQ PHY/QHP: CPT | Performed by: STUDENT IN AN ORGANIZED HEALTH CARE EDUCATION/TRAINING PROGRAM

## 2024-06-03 PROCEDURE — 99213 OFFICE O/P EST LOW 20 MIN: CPT | Performed by: STUDENT IN AN ORGANIZED HEALTH CARE EDUCATION/TRAINING PROGRAM

## 2024-06-03 PROCEDURE — 3078F DIAST BP <80 MM HG: CPT | Performed by: STUDENT IN AN ORGANIZED HEALTH CARE EDUCATION/TRAINING PROGRAM

## 2024-06-03 RX ORDER — FERROUS SULFATE 325(65) MG
325 TABLET ORAL
COMMUNITY

## 2024-06-03 RX ORDER — CETIRIZINE HYDROCHLORIDE 10 MG/1
10 TABLET ORAL DAILY
Qty: 90 TABLET | Refills: 0 | Status: SHIPPED | OUTPATIENT
Start: 2024-06-03

## 2024-06-03 NOTE — PROGRESS NOTES
[facility]  [address] [place] [address]  Phone: [phone]  Fax: [phone]      Date of Visit: 6/3/24  Patient Name: Negra Coburn   Patient :  1960     ASSESSMENT/PLAN   1. Essential hypertension  Assessment & Plan:  In office BP elevated, but BP log brought from home shows patient's BP are WNL  Patient has started making behavioral modifications, including dietary changes and increasing physical activity.  Encouraged patient to continue, but discussed importance of slowly incorporating these changes for lasting change  Patient advised to bring BP cuff to follow-up appointment.  2. Seasonal allergies  Assessment & Plan:  Patient experiencing nasal congestion and PND resulting in throat irritation.  OTC Flonase resulted in raw/uncomfortable nose, so Zyrtec sent to pharmacy  Orders:  -     cetirizine (ZYRTEC) 10 MG tablet; Take 1 tablet by mouth daily, Disp-90 tablet, R-0Normal  3. Acute bilateral low back pain without sciatica  Comments:  - pain likely muscular in etiology. Low back pain stretches provided to pt  - if pain persists, pt will contact office for further eval and possible PT referral  4. Postnasal drip  5. Former smoker, stopped smoking in distant past  6. Overweight (BMI 25.0-29.9)       No follow-ups on file.    HPI    Negra Coburn is a 64 y.o. female who presents today to discuss   Chief Complaint   Patient presents with    Hypertension     Hypertension:  The pt is here for a follow up evaluation of blood pressure. Pt was last seen on 3/20 for HTN; brought in BP cuff at that time, but OTC cuff read sbp 30mmHg greater than office BP cuff, so prescription provided and labs ordered. Labs completed day of last appointment showed no abnormalities- lipid panel wnl, and microalbumin was wnl as well. Today, patient reports she forgot to bring her cuff in. Did bring BP log, will be scanned into chart. Patient reports she

## 2024-06-03 NOTE — PATIENT INSTRUCTIONS
Thank you for following up with us at [facility] office! It was a pleasure to meet you today!     Our plan is the following:  - Your blood pressure log looks great! I want you to bring your BP cuff in the next time you have an appointment so we can make sure your readings from your BP cuff match the readings from the office.     - I want you to try taking Over the counter anithistamine like Zyrtec/Cetirizine to see how the affects that throat irritation. It sounds like postnasal drip, but since you've already tried over the counter flonase and your nose became raw and angry, we'll try a general antihistamine for 1 month and see how you feel.     - I've included a handout with some lower back stretches for you. I think you may benefit from seeing a DO in the office for OMT for that low back pain.    I will be graduating at the end of June, but I plan on moving to [facility] ([address] [place], [place] [id number]. Phone number [phone]). If you would like for me to continue being your family physician, I can continue seeing you at that location in September. If you need to be seen before that time, you should call the [facility] office to schedule an appointment.    If you would like to continue being seen at the [facility] Office, you will be reassigned to another resident in the program in July.     Your medication list is included in the after visit summary. If you find any differences when compared to your medications at home, please contact the office ([phone]) to let us know.   You can also call the office if you have any additional questions or concerns and speak to one of the staff. They will triage and forward the message to the provider.   Have a great rest of your day!

## 2024-06-11 PROBLEM — J30.2 SEASONAL ALLERGIES: Status: ACTIVE | Noted: 2024-06-11

## 2024-06-11 NOTE — ASSESSMENT & PLAN NOTE
Patient experiencing nasal congestion and PND resulting in throat irritation.  OTC Flonase resulted in raw/uncomfortable nose, so Zyrte sent to pharmacy

## 2024-06-11 NOTE — ASSESSMENT & PLAN NOTE
In office BP elevated, but BP log brought from home shows patient's BP are WNL  Patient has started making behavioral modifications, including dietary changes and increasing physical activity.  Encouraged patient to continue, but discussed importance of slowly incorporating these changes for lasting change  Patient advised to bring BP cuff to follow-up appointment.

## 2024-06-15 DIAGNOSIS — M54.50 ACUTE BILATERAL LOW BACK PAIN WITHOUT SCIATICA: Primary | ICD-10-CM

## 2024-06-15 RX ORDER — METHOCARBAMOL 750 MG/1
750 TABLET, FILM COATED ORAL EVERY 6 HOURS PRN
Qty: 30 TABLET | Refills: 0 | Status: SHIPPED | OUTPATIENT
Start: 2024-06-15 | End: 2024-06-25

## 2024-06-21 ENCOUNTER — HOSPITAL ENCOUNTER (EMERGENCY)
Age: 64
Discharge: HOME | End: 2024-06-21
Attending: EMERGENCY MEDICINE
Payer: COMMERCIAL

## 2024-06-21 ENCOUNTER — APPOINTMENT (OUTPATIENT)
Dept: GENERAL RADIOLOGY | Age: 64
End: 2024-06-21
Payer: COMMERCIAL

## 2024-06-21 VITALS
TEMPERATURE: 97.7 F | SYSTOLIC BLOOD PRESSURE: 116 MMHG | HEART RATE: 74 BPM | OXYGEN SATURATION: 100 % | DIASTOLIC BLOOD PRESSURE: 42 MMHG | RESPIRATION RATE: 18 BRPM

## 2024-06-21 DIAGNOSIS — R55 SYNCOPE AND COLLAPSE: Primary | ICD-10-CM

## 2024-06-21 DIAGNOSIS — E86.0 DEHYDRATION: ICD-10-CM

## 2024-06-21 LAB
ANION GAP SERPL CALCULATED.3IONS-SCNC: 9 MMOL/L (ref 9–17)
BASOPHILS # BLD: 0.1 K/UL (ref 0–0.2)
BASOPHILS NFR BLD: 1 % (ref 0–2)
BUN SERPL-MCNC: 25 MG/DL (ref 8–23)
CALCIUM SERPL-MCNC: 8.4 MG/DL (ref 8.6–10.4)
CHLORIDE SERPL-SCNC: 107 MMOL/L (ref 98–107)
CO2 SERPL-SCNC: 26 MMOL/L (ref 20–31)
CREAT SERPL-MCNC: 0.9 MG/DL (ref 0.5–0.9)
EOSINOPHIL # BLD: 0.2 K/UL (ref 0–0.4)
EOSINOPHILS RELATIVE PERCENT: 2 % (ref 1–4)
ERYTHROCYTE [DISTWIDTH] IN BLOOD BY AUTOMATED COUNT: 13.2 % (ref 12.5–15.4)
GFR, ESTIMATED: 72 ML/MIN/1.73M2
GLUCOSE SERPL-MCNC: 124 MG/DL (ref 70–99)
HCT VFR BLD AUTO: 40.4 % (ref 36–46)
HGB BLD-MCNC: 13.8 G/DL (ref 12–16)
LYMPHOCYTES NFR BLD: 1.4 K/UL (ref 1–4.8)
LYMPHOCYTES RELATIVE PERCENT: 13 % (ref 24–44)
MAGNESIUM SERPL-MCNC: 2.1 MG/DL (ref 1.6–2.6)
MCH RBC QN AUTO: 31.2 PG (ref 26–34)
MCHC RBC AUTO-ENTMCNC: 34 G/DL (ref 31–37)
MCV RBC AUTO: 91.6 FL (ref 80–100)
MONOCYTES NFR BLD: 0.4 K/UL (ref 0.1–1.2)
MONOCYTES NFR BLD: 4 % (ref 2–11)
NEUTROPHILS NFR BLD: 80 % (ref 36–66)
NEUTS SEG NFR BLD: 8.8 K/UL (ref 1.8–7.7)
PLATELET # BLD AUTO: 273 K/UL (ref 140–450)
PMV BLD AUTO: 7.4 FL (ref 6–12)
POTASSIUM SERPL-SCNC: 4.7 MMOL/L (ref 3.7–5.3)
RBC # BLD AUTO: 4.41 M/UL (ref 4–5.2)
SODIUM SERPL-SCNC: 142 MMOL/L (ref 135–144)
TROPONIN I SERPL HS-MCNC: 7 NG/L (ref 0–14)
WBC OTHER # BLD: 10.9 K/UL (ref 3.5–11)

## 2024-06-21 PROCEDURE — 93005 ELECTROCARDIOGRAM TRACING: CPT | Performed by: EMERGENCY MEDICINE

## 2024-06-21 PROCEDURE — 96361 HYDRATE IV INFUSION ADD-ON: CPT

## 2024-06-21 PROCEDURE — 83735 ASSAY OF MAGNESIUM: CPT

## 2024-06-21 PROCEDURE — 84484 ASSAY OF TROPONIN QUANT: CPT

## 2024-06-21 PROCEDURE — 96360 HYDRATION IV INFUSION INIT: CPT

## 2024-06-21 PROCEDURE — 71046 X-RAY EXAM CHEST 2 VIEWS: CPT

## 2024-06-21 PROCEDURE — 85025 COMPLETE CBC W/AUTO DIFF WBC: CPT

## 2024-06-21 PROCEDURE — 80048 BASIC METABOLIC PNL TOTAL CA: CPT

## 2024-06-21 PROCEDURE — 36415 COLL VENOUS BLD VENIPUNCTURE: CPT

## 2024-06-21 PROCEDURE — 2580000003 HC RX 258: Performed by: EMERGENCY MEDICINE

## 2024-06-21 PROCEDURE — 99285 EMERGENCY DEPT VISIT HI MDM: CPT

## 2024-06-21 RX ORDER — 0.9 % SODIUM CHLORIDE 0.9 %
1000 INTRAVENOUS SOLUTION INTRAVENOUS ONCE
Status: DISCONTINUED | OUTPATIENT
Start: 2024-06-21 | End: 2024-06-21 | Stop reason: HOSPADM

## 2024-06-21 RX ORDER — 0.9 % SODIUM CHLORIDE 0.9 %
1000 INTRAVENOUS SOLUTION INTRAVENOUS ONCE
Status: COMPLETED | OUTPATIENT
Start: 2024-06-21 | End: 2024-06-21

## 2024-06-21 RX ADMIN — SODIUM CHLORIDE 1000 ML: 9 INJECTION, SOLUTION INTRAVENOUS at 16:55

## 2024-06-21 ASSESSMENT — PAIN - FUNCTIONAL ASSESSMENT: PAIN_FUNCTIONAL_ASSESSMENT: 0-10

## 2024-06-21 ASSESSMENT — PAIN SCALES - GENERAL: PAINLEVEL_OUTOF10: 0

## 2024-06-21 NOTE — DISCHARGE INSTRUCTIONS
Drink plenty of fluids to avoid dehydration.  Give yourself the rest with the plasmapheresis.  Follow-up with your primary care doctor in the morning for reevaluation.  Return to the emergency department with any problems or concerns as discussed.

## 2024-06-21 NOTE — ED PROVIDER NOTES
[facility] Emergency Department  [address]  [facility] [address]  Phone: [phone]  Fax: [phone]  EMERGENCY DEPARTMENT ENCOUNTER      Pt Name: Negra Coburn  MRN: [id number]  Birthdate 1960  Date of evaluation: 6/21/2024    CHIEF COMPLAINT       Chief Complaint   Patient presents with    Loss of Consciousness     Pt gave plasma this morning, while shopping at [employer], she felt weak and had sycopal episode while sitting in wheelchair       HISTORY OF PRESENT ILLNESS    Negra Coburn is a 64 y.o. female who presents the emergency department with complaint of syncope.  Patient states she gives plasma this morning and 2 times a week.  She has been eating and drinking well.  They went to [employer] and her daughter had to take her inside in a wheelchair because she was feeling very weak.  As she was in the wheelchair she had a syncopal episode while she was in the chair.  She was out for about 30 seconds.  Did not have any seizure activity she was just not responding.  Did not have any urinary, bowel incontinence.  Patient has not been sick with anything recently.  She has a history of hypertension has taken all of her medications today.  Patient states it was hot she felt sweaty admits 100 degrees outdoors today.  She denies any nausea, vomiting, diarrhea, constipation, abdominal pain.  She denies any chest pain, shortness of breath.  She denies any headache, visual disturbance or speech difficulties.  Denies any paresthesias, or weakness.  Patient denies any flank pain, hematuria, dysuria.  She has taken all of her medications today.  REVIEW OF SYSTEMS     Review of Systems   All other systems reviewed and are negative.    PAST MEDICAL HISTORY    has a past medical history of Former smoker, GERD (gastroesophageal reflux disease), HTN (hypertension), Pneumonia, Sleep apnea, and Wears glasses.    SURGICAL HISTORY      has a past surgical history that includes Tonsillectomy;

## 2024-06-22 LAB
EKG ATRIAL RATE: 241 BPM
EKG P AXIS: 59 DEGREES
EKG Q-T INTERVAL: 382 MS
EKG QRS DURATION: 72 MS
EKG QTC CALCULATION (BAZETT): 432 MS
EKG R AXIS: 32 DEGREES
EKG T AXIS: 26 DEGREES
EKG VENTRICULAR RATE: 77 BPM

## 2024-07-16 DIAGNOSIS — K21.9 GASTROESOPHAGEAL REFLUX DISEASE WITHOUT ESOPHAGITIS: ICD-10-CM

## 2024-07-16 DIAGNOSIS — I10 ESSENTIAL HYPERTENSION: ICD-10-CM

## 2024-07-16 RX ORDER — LISINOPRIL 40 MG/1
40 TABLET ORAL DAILY
Qty: 30 TABLET | Refills: 0 | Status: SHIPPED | OUTPATIENT
Start: 2024-07-16

## 2024-08-09 DIAGNOSIS — I10 ESSENTIAL HYPERTENSION: ICD-10-CM

## 2024-08-10 RX ORDER — LISINOPRIL 40 MG/1
40 TABLET ORAL DAILY
Qty: 30 TABLET | Refills: 0 | OUTPATIENT
Start: 2024-08-10

## 2024-08-12 DIAGNOSIS — K21.9 GASTROESOPHAGEAL REFLUX DISEASE WITHOUT ESOPHAGITIS: ICD-10-CM

## 2024-08-13 RX ORDER — OMEPRAZOLE 20 MG/1
CAPSULE, DELAYED RELEASE ORAL
Qty: 30 CAPSULE | Refills: 0 | Status: SHIPPED | OUTPATIENT
Start: 2024-08-13

## 2024-08-16 DIAGNOSIS — I10 ESSENTIAL HYPERTENSION: ICD-10-CM

## 2024-08-19 RX ORDER — LISINOPRIL 40 MG/1
40 TABLET ORAL DAILY
Qty: 30 TABLET | Refills: 2 | Status: SHIPPED | OUTPATIENT
Start: 2024-08-19

## 2024-09-12 ENCOUNTER — OFFICE VISIT (OUTPATIENT)
Age: 64
End: 2024-09-12
Payer: COMMERCIAL

## 2024-09-12 VITALS
WEIGHT: 193.4 LBS | BODY MASS INDEX: 30.29 KG/M2 | RESPIRATION RATE: 12 BRPM | HEART RATE: 67 BPM | SYSTOLIC BLOOD PRESSURE: 134 MMHG | TEMPERATURE: 98.2 F | DIASTOLIC BLOOD PRESSURE: 53 MMHG

## 2024-09-12 DIAGNOSIS — M25.512 CHRONIC LEFT SHOULDER PAIN: ICD-10-CM

## 2024-09-12 DIAGNOSIS — M54.50 CHRONIC BILATERAL LOW BACK PAIN WITHOUT SCIATICA: Primary | ICD-10-CM

## 2024-09-12 DIAGNOSIS — M22.41 CHONDROMALACIA OF BOTH PATELLAE: ICD-10-CM

## 2024-09-12 DIAGNOSIS — G89.29 CHRONIC LEFT SHOULDER PAIN: ICD-10-CM

## 2024-09-12 DIAGNOSIS — G89.29 CHRONIC BILATERAL LOW BACK PAIN WITHOUT SCIATICA: Primary | ICD-10-CM

## 2024-09-12 DIAGNOSIS — M22.42 CHONDROMALACIA OF BOTH PATELLAE: ICD-10-CM

## 2024-09-12 PROCEDURE — 99211 OFF/OP EST MAY X REQ PHY/QHP: CPT | Performed by: FAMILY MEDICINE

## 2024-09-12 PROCEDURE — 3075F SYST BP GE 130 - 139MM HG: CPT | Performed by: FAMILY MEDICINE

## 2024-09-12 PROCEDURE — 99214 OFFICE O/P EST MOD 30 MIN: CPT | Performed by: FAMILY MEDICINE

## 2024-09-12 PROCEDURE — 3078F DIAST BP <80 MM HG: CPT | Performed by: FAMILY MEDICINE

## 2024-09-12 RX ORDER — CELECOXIB 200 MG/1
200 CAPSULE ORAL DAILY
Qty: 90 CAPSULE | Refills: 3 | Status: SHIPPED | OUTPATIENT
Start: 2024-09-12

## 2024-09-12 RX ORDER — DIPHENHYDRAMINE HCL 25 MG
50 CAPSULE ORAL NIGHTLY PRN
COMMUNITY

## 2024-09-12 ASSESSMENT — ENCOUNTER SYMPTOMS
TROUBLE SWALLOWING: 0
RECTAL PAIN: 0
ABDOMINAL DISTENTION: 0
CHOKING: 0
BLOOD IN STOOL: 0
COUGH: 0
EYE PAIN: 0
VOMITING: 0
ANAL BLEEDING: 0
WHEEZING: 0
RHINORRHEA: 1
SHORTNESS OF BREATH: 0
CONSTIPATION: 0
SORE THROAT: 0
PHOTOPHOBIA: 0
ABDOMINAL PAIN: 0
DIARRHEA: 0
NAUSEA: 0
BACK PAIN: 1

## 2024-09-22 DIAGNOSIS — K21.9 GASTROESOPHAGEAL REFLUX DISEASE WITHOUT ESOPHAGITIS: ICD-10-CM

## 2024-10-24 DIAGNOSIS — K21.9 GASTROESOPHAGEAL REFLUX DISEASE WITHOUT ESOPHAGITIS: ICD-10-CM

## 2024-11-02 DIAGNOSIS — I10 PRIMARY HYPERTENSION: ICD-10-CM

## 2024-11-04 RX ORDER — AMLODIPINE BESYLATE 5 MG/1
5 TABLET ORAL DAILY
Qty: 90 TABLET | Refills: 1 | Status: SHIPPED | OUTPATIENT
Start: 2024-11-04

## 2024-11-10 DIAGNOSIS — I10 ESSENTIAL HYPERTENSION: ICD-10-CM

## 2024-11-11 RX ORDER — LISINOPRIL 40 MG/1
40 TABLET ORAL DAILY
Qty: 30 TABLET | Refills: 5 | Status: SHIPPED | OUTPATIENT
Start: 2024-11-11

## 2024-11-20 ENCOUNTER — OFFICE VISIT (OUTPATIENT)
Age: 64
End: 2024-11-20
Payer: COMMERCIAL

## 2024-11-20 VITALS
RESPIRATION RATE: 16 BRPM | DIASTOLIC BLOOD PRESSURE: 39 MMHG | HEART RATE: 77 BPM | BODY MASS INDEX: 31.23 KG/M2 | WEIGHT: 199 LBS | SYSTOLIC BLOOD PRESSURE: 139 MMHG | HEIGHT: 67 IN | TEMPERATURE: 98.2 F

## 2024-11-20 DIAGNOSIS — M22.41 CHONDROMALACIA OF BOTH PATELLAE: ICD-10-CM

## 2024-11-20 DIAGNOSIS — Z71.89 ADVANCED DIRECTIVES, COUNSELING/DISCUSSION: ICD-10-CM

## 2024-11-20 DIAGNOSIS — M54.50 CHRONIC BILATERAL LOW BACK PAIN WITHOUT SCIATICA: ICD-10-CM

## 2024-11-20 DIAGNOSIS — K21.9 GASTROESOPHAGEAL REFLUX DISEASE WITHOUT ESOPHAGITIS: ICD-10-CM

## 2024-11-20 DIAGNOSIS — M22.2X1 PATELLOFEMORAL ARTHRALGIA OF BOTH KNEES: Primary | ICD-10-CM

## 2024-11-20 DIAGNOSIS — G89.29 CHRONIC LEFT SHOULDER PAIN: ICD-10-CM

## 2024-11-20 DIAGNOSIS — M25.512 CHRONIC LEFT SHOULDER PAIN: ICD-10-CM

## 2024-11-20 DIAGNOSIS — I10 ESSENTIAL HYPERTENSION: ICD-10-CM

## 2024-11-20 DIAGNOSIS — M22.42 CHONDROMALACIA OF BOTH PATELLAE: ICD-10-CM

## 2024-11-20 DIAGNOSIS — G89.29 CHRONIC BILATERAL LOW BACK PAIN WITHOUT SCIATICA: ICD-10-CM

## 2024-11-20 DIAGNOSIS — M22.2X2 PATELLOFEMORAL ARTHRALGIA OF BOTH KNEES: Primary | ICD-10-CM

## 2024-11-20 PROCEDURE — 99214 OFFICE O/P EST MOD 30 MIN: CPT | Performed by: FAMILY MEDICINE

## 2024-11-20 PROCEDURE — 3075F SYST BP GE 130 - 139MM HG: CPT | Performed by: FAMILY MEDICINE

## 2024-11-20 PROCEDURE — 3078F DIAST BP <80 MM HG: CPT | Performed by: FAMILY MEDICINE

## 2024-11-20 RX ORDER — CELECOXIB 200 MG/1
200 CAPSULE ORAL DAILY
Qty: 90 CAPSULE | Refills: 3 | Status: SHIPPED | OUTPATIENT
Start: 2024-11-20

## 2024-11-20 NOTE — ASSESSMENT & PLAN NOTE
Will continue the Celebrex for now consider trying to attempt to wean off sometime in the next month or so.  By taking 1 every other day for a period of time alternating with extended release Tylenol 650 mg 1 to 2 tablets twice a day

## 2024-11-20 NOTE — PROGRESS NOTES
but afterwards she would have pain at rest she was starting to limit how she did things but she was not calling off work because of it.  She is now down to a 2-3.  She is uncomfortable following work but she is not limiting her activities.  She does not have obvious pain at work.  She is continuing to do her stretching as well as her strengthening exercises.    ____________________________________________________________________________________  Advanced Directives and Code Status    Discussion participants: Dr. Robert Hall MD, FAAFP    Does patient want to discuss advanced directives and code status currently?:  Wants to discuss in a follow up appointment.   Have provided the patient with the DNR order form today's date for her to look over after she completes reading and sharing the information found in the choices living well at the end of life handout/advance directives packet.  Both are provided to her today's date.      _____________________________________________________________________________________     Care gaps:     Tdap vaccination      REVIEW OF SYSTEM      Review of Systems   Constitutional:  Negative for activity change, appetite change, diaphoresis, fatigue, fever and unexpected weight change.   HENT:  Negative for congestion, dental problem, drooling, ear discharge, mouth sores, nosebleeds, rhinorrhea, sore throat, tinnitus and trouble swallowing.    Eyes:  Negative for photophobia and pain.   Respiratory:  Negative for cough, choking, shortness of breath and wheezing.    Cardiovascular:  Positive for palpitations. Negative for chest pain and leg swelling.   Gastrointestinal:  Negative for abdominal distention, abdominal pain, anal bleeding, blood in stool, constipation, diarrhea, nausea, rectal pain and vomiting.   Genitourinary:  Positive for dyspareunia and pelvic pain. Negative for difficulty urinating, dysuria, frequency, menstrual problem, vaginal bleeding and vaginal discharge.

## 2024-11-20 NOTE — ASSESSMENT & PLAN NOTE
Starting the Celebrex did not cause an exacerbation of her acid reflux disease we will continue the omeprazole.

## 2024-11-20 NOTE — ASSESSMENT & PLAN NOTE
The blood pressure at today's visit is excellent despite having started on the Celebrex with her current medication.  No need for follow-up for blood pressure for at least another 6 months.

## 2024-11-20 NOTE — ASSESSMENT & PLAN NOTE
Discussed advanced directives briefly.  Have given all the information the patient today's date advanced directives packet as well as DNR sheet.  She will look it over and consider scheduling a separate appointment with our factly Ede De La Garza to finalize the process.

## 2024-11-23 ASSESSMENT — ENCOUNTER SYMPTOMS
SORE THROAT: 0
WHEEZING: 0
RECTAL PAIN: 0
ABDOMINAL PAIN: 0
ANAL BLEEDING: 0
ABDOMINAL DISTENTION: 0
BACK PAIN: 1
BLOOD IN STOOL: 0
CHOKING: 0
COUGH: 0
DIARRHEA: 0
PHOTOPHOBIA: 0
CONSTIPATION: 0
RHINORRHEA: 0
SHORTNESS OF BREATH: 0
EYE PAIN: 0
NAUSEA: 0
VOMITING: 0
TROUBLE SWALLOWING: 0

## 2024-12-20 ENCOUNTER — TELEPHONE (OUTPATIENT)
Age: 64
End: 2024-12-20

## 2024-12-20 DIAGNOSIS — N63.10 MASS OF RIGHT BREAST, UNSPECIFIED QUADRANT: Primary | ICD-10-CM

## 2024-12-20 NOTE — TELEPHONE ENCOUNTER
Jeny from [facility] scheduling calling.  Patient called to schedule her screening mammogram but states she feels a lump in her right breast.  Asking for order for diagnostic bilateral mammogram and US right breast.    Please let patient know when orders are done so she can call to schedule.

## 2025-01-28 ENCOUNTER — HOSPITAL ENCOUNTER (OUTPATIENT)
Dept: WOMENS IMAGING | Age: 65
Discharge: HOME | End: 2025-01-30
Attending: FAMILY MEDICINE
Payer: COMMERCIAL

## 2025-01-28 VITALS — HEIGHT: 67 IN | WEIGHT: 198 LBS | BODY MASS INDEX: 31.08 KG/M2

## 2025-01-28 DIAGNOSIS — N63.10 MASS OF RIGHT BREAST, UNSPECIFIED QUADRANT: ICD-10-CM

## 2025-01-28 DIAGNOSIS — N63.10 MASS OF RIGHT BREAST, UNSPECIFIED QUADRANT: Primary | ICD-10-CM

## 2025-01-28 PROCEDURE — 76642 ULTRASOUND BREAST LIMITED: CPT

## 2025-01-28 PROCEDURE — G0279 TOMOSYNTHESIS, MAMMO: HCPCS

## 2025-01-28 NOTE — PROGRESS NOTES
Abnormal findings on mammogram and ultrasound of right breast.  Will place an order for ultrasound directed biopsy of the mass of the right breast.

## 2025-02-07 ENCOUNTER — HOSPITAL ENCOUNTER (OUTPATIENT)
Dept: WOMENS IMAGING | Age: 65
Discharge: HOME | End: 2025-02-09
Payer: COMMERCIAL

## 2025-02-07 VITALS — HEART RATE: 80 BPM | DIASTOLIC BLOOD PRESSURE: 78 MMHG | SYSTOLIC BLOOD PRESSURE: 147 MMHG

## 2025-02-07 DIAGNOSIS — N63.10 MASS OF RIGHT BREAST, UNSPECIFIED QUADRANT: ICD-10-CM

## 2025-02-07 DIAGNOSIS — R92.8 ABNORMAL FINDING ON BREAST IMAGING: ICD-10-CM

## 2025-02-07 PROCEDURE — 88305 TISSUE EXAM BY PATHOLOGIST: CPT

## 2025-02-07 PROCEDURE — 77065 DX MAMMO INCL CAD UNI: CPT

## 2025-02-07 PROCEDURE — 88360 TUMOR IMMUNOHISTOCHEM/MANUAL: CPT

## 2025-02-07 PROCEDURE — 19083 BX BREAST 1ST LESION US IMAG: CPT

## 2025-02-07 PROCEDURE — 88342 IMHCHEM/IMCYTCHM 1ST ANTB: CPT

## 2025-02-07 ASSESSMENT — PAIN SCALES - GENERAL
PAINLEVEL_OUTOF10: 2
PAINLEVEL_OUTOF10: 2

## 2025-02-11 ENCOUNTER — PATIENT MESSAGE (OUTPATIENT)
Age: 65
End: 2025-02-11

## 2025-02-11 DIAGNOSIS — Z17.0 MALIGNANT NEOPLASM OF UPPER-OUTER QUADRANT OF RIGHT BREAST IN FEMALE, ESTROGEN RECEPTOR POSITIVE (HCC): Primary | ICD-10-CM

## 2025-02-11 DIAGNOSIS — C50.411 MALIGNANT NEOPLASM OF UPPER-OUTER QUADRANT OF RIGHT BREAST IN FEMALE, ESTROGEN RECEPTOR POSITIVE (HCC): Primary | ICD-10-CM

## 2025-02-11 LAB — SURGICAL PATHOLOGY REPORT: NORMAL

## 2025-02-11 NOTE — TELEPHONE ENCOUNTER
I spoke with patient at length regarding the findings consistent with localized capsule of the breast.  Discussed [facility] as well as consult Dr. Bermudez.    Have placed orders into the system.

## 2025-02-12 ENCOUNTER — TELEPHONE (OUTPATIENT)
Dept: ONCOLOGY | Age: 65
End: 2025-02-12

## 2025-02-12 NOTE — TELEPHONE ENCOUNTER
Name: Negra Coburn  : 1960  MRN: [id number]    Oncology Navigation- Initial Note: first attempt     Intake-  Contact Type: Telephone    Continuum of Care: Diagnosis/Active Treatment    Smoking hx: former smoker, quit      Notes: Attempted to contact no answer. VM left introducing self/role. Pt was referred to [facility], writer coordinate pts appts.     Electronically signed by Linda Carlos RN on 2025 at 10:01 AM

## 2025-02-13 ENCOUNTER — TELEPHONE (OUTPATIENT)
Dept: SURGERY | Age: 65
End: 2025-02-13

## 2025-02-13 ENCOUNTER — TELEPHONE (OUTPATIENT)
Dept: ONCOLOGY | Age: 65
End: 2025-02-13

## 2025-02-13 NOTE — TELEPHONE ENCOUNTER
Name: Negra Coburn  : 1960  MRN: [id number]    Oncology Navigation- Initial Note: second attempt     Intake-  Contact Type: Telephone    Continuum of Care: Diagnosis/Active Treatment    Notes: Writer spoke with pt, introduced self and navigator role. Pt was referred to navigation by her PCP. We discussed referral to breast clinic and Dr Bermudez. Pt given appt details for MO, RO and genetics appts. Dr Bermudez's office will contact pt for appt outside of clinic.      Barriers of care were reviewed with pt. Currently pt reports no barriers to care. Discussed that if future barriers arise, navigator can help with resources to overcome these.     Pt was given navigator's contact information and encouraged to contact writer as needed.     Pt was added to [facility] to requested tumor board discussion.     Electronically signed by Linda Carlos RN on 2025 at 12:04 PM

## 2025-02-18 ENCOUNTER — TELEPHONE (OUTPATIENT)
Dept: SURGERY | Age: 65
End: 2025-02-18

## 2025-02-18 NOTE — TELEPHONE ENCOUNTER
Patient stated she was returning a missed call and can be reached at [phone] . Okay to leave a message.

## 2025-02-21 ENCOUNTER — HOSPITAL ENCOUNTER (OUTPATIENT)
Dept: RADIATION ONCOLOGY | Age: 65
Discharge: HOME | End: 2025-02-21

## 2025-02-21 ENCOUNTER — OFFICE VISIT (OUTPATIENT)
Dept: ONCOLOGY | Age: 65
End: 2025-02-21

## 2025-02-21 ENCOUNTER — INITIAL CONSULT (OUTPATIENT)
Dept: ONCOLOGY | Age: 65
End: 2025-02-21

## 2025-02-21 ENCOUNTER — TELEPHONE (OUTPATIENT)
Dept: ONCOLOGY | Age: 65
End: 2025-02-21

## 2025-02-21 VITALS
DIASTOLIC BLOOD PRESSURE: 68 MMHG | RESPIRATION RATE: 14 BRPM | OXYGEN SATURATION: 98 % | BODY MASS INDEX: 31.48 KG/M2 | HEART RATE: 75 BPM | SYSTOLIC BLOOD PRESSURE: 103 MMHG | WEIGHT: 201 LBS

## 2025-02-21 DIAGNOSIS — C50.211 MALIGNANT NEOPLASM OF UPPER-INNER QUADRANT OF RIGHT BREAST IN FEMALE, ESTROGEN RECEPTOR POSITIVE (HCC): Primary | ICD-10-CM

## 2025-02-21 DIAGNOSIS — Z17.0 MALIGNANT NEOPLASM OF UPPER-INNER QUADRANT OF RIGHT BREAST IN FEMALE, ESTROGEN RECEPTOR POSITIVE (HCC): Primary | ICD-10-CM

## 2025-02-21 NOTE — PROGRESS NOTES
Patient: ZELALEM PRICE    MRN: [id number]  PCP: VIV RAMSEY MD  Oncologist: ALAN FINN MD    Date of test: 2/21/25   Tested Body Part: ARM  Testing Stage:  BASELINE     [facility] BASELINE ASSESSMENT:  BASELINE: -1.3

## 2025-02-21 NOTE — CONSULTS
[facility]            Radiation Oncology          [address] [place] [address]        O: [phone]        F: [phone]       [facility]             [facility]    Patient: Negra Coburn   YOB: 1960       Dear Dr Lainez ref. provider found:    Thank you for referring Negra Coburn to me for evaluation. Below are the relevant portions of my assessment and plan of care. If you have questions, please do not hesitate to call me. I look forward to following this patient along with you.     Sincerely,    Electronically signed by Xavi Boykin MD on 2025 at 11:12 AM    CC: Patient Care Team:  Robert Hall MD as PCP - General (Family Medicine)  Robert Hall MD as PCP - Empaneled Provider  Torsten Gallardo MD as Consulting Physician (Pulmonology)  Clemente Bermudez MD as Consulting Physician (General Surgery)  Linda Carlos RN as Nurse Navigator (Oncology)  ------------------------------------------------------------------------------------------------------------------------------------------------------------------------------------------      RADIATION ONCOLOGY NEW PATIENT VISIT:    Date of Service: 2025    Location:  [facility] Radiation Oncology,   [address], [place] [id number] [phone]     Patient ID:   Negra Coburn  : 1960   MRN: [id number]    CHIEF COMPLAINT: \"I have breast cancer\"    DIAGNOSIS:  Invasive lobular carcinoma of the right breast 12 o'clock position cT2 N0 M0 ER/IN positive HER2/henry negative    HISTORY OF PRESENT ILLNESS:   Negra Coburn is a 64 y.o. female who had a mammogram which demonstrated an abnormality in the right breast at the 12 o'clock position measuring 2.5 cm in the greatest dimension with a BI-RADS of 4.  Patient underwent a biopsy of the right breast mass which was positive for invasive lobular carcinoma grade 1 ER/IN positive HER2/henry negative.  Patient is

## 2025-02-21 NOTE — TELEPHONE ENCOUNTER
Patient seen in consultation with Dr Osman, Dr Boykin, and Yelena Beatty [facility]     Treatment plan recommendations are:    genetics  Lump vs mast  Oncotype  XRT ( if Lump)  Endocrine therapy:       Writer met with patient and her family.  Introduced myself as the breast cancer patient navigator. Explained to patient that I would be available to help navigate the disease process, treatment, and follow up.       Patient given [place] breast cancer folder with community resources, breast cancer information, and contact information.   Baseline [place] scan has been completed.     Patient meets criteria for Genetic testing.  Labs drawn.  Yelena to call in 2-3 weeks with results.     Reviewed breast clinic recommendations with patient.  Pt given copy of recommendations.     Patient to follow up with MO and RO post op    Oncology rehab discussed.  Referral placed Lymphedema Clinic.      Psychosocial and Functional assessment completed and forwarded to Spiritual Care and .      Encouraged patient to call with questions, concerns, needs.       Will continue to follow.

## 2025-02-21 NOTE — PROGRESS NOTES
DIAGNOSIS:   Right-sided breast cancer, clinical stage is T2 N0 M0, ER/NV positive and HER2/henry negative  Other comorbidity including obstructive sleep apnea, GERD, hypertension  CURRENT THERAPY:  Discussing options, leaning towards lumpectomy and axillary sampling followed by adjuvant therapy  BRIEF CASE HISTORY:   Negra Coburn is a very pleasant 64 y.o. female who is referred to us for recently diagnosed right-sided breast cancer.  She found a mass in her breast in December 2024.  She underwent diagnostic mammogram in January/2025 and that showed 13 x 16 x 25 mm mass in the upper part of her right breast.  Biopsy was done and showed invasive lobular carcinoma that was ER/NV positive HER2/henry negative.  She is sent to us for a consultation, she presents today accompanied by her family.  She is well-informed and verbalized excellent understanding of her current condition.      In 2014, she the patient felt a lump in her right axilla.  She underwent a biopsy of the area and that showed apocrine sweat glands but no evidence of malignancy or abnormality otherwise  PAST MEDICAL HISTORY: has a past medical history of Former smoker, GERD (gastroesophageal reflux disease), HTN (hypertension), Pneumonia, Sleep apnea, and Wears glasses.    PAST SURGICAL HISTORY: has a past surgical history that includes Tonsillectomy; Foot fracture surgery (Left, 1967); Bunionectomy (Right, 2015); US Breast Fine Needle Aspiration (Right, 2014); Colonoscopy (03/19/2024); and US BREAST BIOPSY W LOC DEVICE 1ST LESION RIGHT (Right, 2/7/2025).     CURRENT MEDICATIONS:  has a current medication list which includes the following prescription(s): celecoxib, lisinopril, amlodipine, omeprazole, diphenhydramine, ferrous sulfate, comfort touch bp cuff/medium, diclofenac sodium, vitamin d3, albuterol sulfate hfa, and multi for her 50+.    ALLERGIES:  has No Known Allergies.    FAMILY HISTORY: family history includes Alcohol Abuse in her brother;

## 2025-02-21 NOTE — PROGRESS NOTES
[facility] Genetic Counseling Program   Hereditary Cancer Risk Assessment     Name: Negra Coburn   YOB: 1960   Date of Consultation: 25     Ms. Coburn was seen in the [facility] Comprehensive Breast Cancer Program and as part of her appointment was offered a genetic evaluation.      PERSONAL HISTORY   Ms. Coburn is a 64 y.o.  female who was recently diagnosed with breast cancer. Specifically, an invasive lobular carcinoma of the right breast. The tumor is estrogen receptor positive, progesterone receptor positive, and Mvx5Bmf negative.     FAMILY HISTORY  Ms. Coburn has one biological daughter (35y).     She has four maternal half sisters and one full brother. One of her half sisters was diagnosed with leukemia in her 30s.     Her mother lived to age 88, no cancer history. Her mother had just one brother who  in his late 50s. Her maternal grandmother lived to age 72 and her maternal grandfather lived to his late 50s. They all passed away from heart related conditions.     She has no medical history information about her paternal family.     Ms. Coburn reports Nepali ancestry and denies any known Ashkenazi Shinto heritage.     RISK ASSESSMENT   We discussed that approximately 5-10% of cancers are due to a hereditary gene mutation which causes an increased risk for certain cancers. Hereditary cancers are typically diagnosed at younger ages (under age 50y) and occur in multiple generations of a family. Multiple individuals with the same type of cancer (example: breast or colorectal) or uncommon cancers (example: ovarian, pancreatic, male breast cancer) are also features of hereditary cancers.     We discussed the low likelihood of a hereditary gene mutation given that her breast cancer was diagnosed over age 50 and that she does not have a strong family history of cancer. With that being said, she does have a small maternal family structure with few female relatives and

## 2025-02-24 ENCOUNTER — OFFICE VISIT (OUTPATIENT)
Dept: SURGERY | Age: 65
End: 2025-02-24
Payer: COMMERCIAL

## 2025-02-24 ENCOUNTER — TELEPHONE (OUTPATIENT)
Dept: ONCOLOGY | Age: 65
End: 2025-02-24

## 2025-02-24 VITALS
WEIGHT: 201 LBS | HEIGHT: 67 IN | BODY MASS INDEX: 31.55 KG/M2 | HEART RATE: 71 BPM | DIASTOLIC BLOOD PRESSURE: 64 MMHG | SYSTOLIC BLOOD PRESSURE: 152 MMHG

## 2025-02-24 DIAGNOSIS — C50.411 MALIGNANT NEOPLASM OF UPPER-OUTER QUADRANT OF RIGHT BREAST IN FEMALE, ESTROGEN RECEPTOR POSITIVE (HCC): Primary | ICD-10-CM

## 2025-02-24 DIAGNOSIS — Z17.0 MALIGNANT NEOPLASM OF UPPER-OUTER QUADRANT OF RIGHT BREAST IN FEMALE, ESTROGEN RECEPTOR POSITIVE (HCC): Primary | ICD-10-CM

## 2025-02-24 PROCEDURE — G8427 DOCREV CUR MEDS BY ELIG CLIN: HCPCS | Performed by: SURGERY

## 2025-02-24 PROCEDURE — 1036F TOBACCO NON-USER: CPT | Performed by: SURGERY

## 2025-02-24 PROCEDURE — G8417 CALC BMI ABV UP PARAM F/U: HCPCS | Performed by: SURGERY

## 2025-02-24 PROCEDURE — 3017F COLORECTAL CA SCREEN DOC REV: CPT | Performed by: SURGERY

## 2025-02-24 PROCEDURE — 3078F DIAST BP <80 MM HG: CPT | Performed by: SURGERY

## 2025-02-24 PROCEDURE — 3077F SYST BP >= 140 MM HG: CPT | Performed by: SURGERY

## 2025-02-24 PROCEDURE — 99203 OFFICE O/P NEW LOW 30 MIN: CPT | Performed by: SURGERY

## 2025-02-24 ASSESSMENT — ENCOUNTER SYMPTOMS
WHEEZING: 0
NAUSEA: 0
APNEA: 1
RECTAL PAIN: 0
SHORTNESS OF BREATH: 0
VOMITING: 0
COLOR CHANGE: 0
BLOOD IN STOOL: 0
ABDOMINAL DISTENTION: 0
BACK PAIN: 0
CHEST TIGHTNESS: 0
COUGH: 0
ANAL BLEEDING: 0

## 2025-02-24 NOTE — PROGRESS NOTES
Patient's Name/Date of Birth: Negra Coburn / 1960 (64 y.o.)    Date: 2025    HPI: Pt is a 64 y.o. female who presents to [facility] for evaluation of a new right breast cancer.  Patient describes having had an itch over the lesion and ultimately was found to be cancer.  This first appeared around [place].  She never had problems with her breast before except for an aspiration of the cyst in the right breast in .    Her imaging consisted of diagnostic mammogram performed on 2025 with an ultrasound. Targeted ultrasonography over palpable abnormality reveals evidence of an  ill-defined multilobular shadowing solid mass at 12 o'clock 3-5 cm from the nipple measuring approximately 13 x 16 x 25 mm.  She subsequently had a biopsy that had the following pathology results:    A. RIGHT BREAST, 12 O'CLOCK, 3-5 CM FROM NIPPLE, BIOPSY:  Invasive  lobular carcinoma, preliminary grade 1 (of 3). ER positive (%),  ND positive (%). HER2 is negative (1+).         GYN HISTORY:  Menarche: 15  Pregnancy History: G 1, P 1, First Child at 29  years old  Menopause: 52 years  HRT:  none    Family hx: sister had leukemia when she was in her 30's ( from MVC)    Physical Exam:  Vitals:    25 0918   BP: (!) 152/64   Pulse: 71     General:A & O x3  HEENT:  NCAT  BREAST: The bilateral breasts are symmetric with no skin changes, nipple deformity, discharge, or masses.  There is no tenderness to palpation. The right breast has a mass in the midline upper that has skin involvment and is about 3 cm.   Lymph Nodes:  There is no lymphadenopathy in the supraclavicular, infraclavicular, or axillary areas bilaterally.  Extremity: Normal, without deformities, edema, or skin discoloration  SKIN: Skin color, texture, turgor normal. No rashes or lesions.    Assessment/Plan:  1. Malignant neoplasm of upper-outer quadrant of right breast in female, estrogen receptor positive

## 2025-02-24 NOTE — PROGRESS NOTES
Review of Systems   Constitutional:  Negative for activity change, appetite change, chills, diaphoresis, fatigue, fever and unexpected weight change.   Respiratory:  Positive for apnea. Negative for cough, chest tightness, shortness of breath and wheezing.    Cardiovascular:  Negative for chest pain and leg swelling.   Gastrointestinal:  Negative for abdominal distention, anal bleeding, blood in stool, nausea, rectal pain and vomiting.   Genitourinary:  Negative for decreased urine volume, difficulty urinating and frequency.   Musculoskeletal:  Negative for back pain.   Skin:  Negative for color change.   Allergic/Immunologic: Negative for food allergies and immunocompromised state.   Neurological:  Negative for seizures, syncope, speech difficulty, weakness, light-headedness and numbness.   Hematological:  Negative for adenopathy. Does not bruise/bleed easily.   Psychiatric/Behavioral:  The patient is not nervous/anxious.

## 2025-02-24 NOTE — TELEPHONE ENCOUNTER
Name: Negra Coburn  : 1960  MRN: [id number]    Oncology Navigation Follow-Up Note    Contact Type:  Telephone    Notes: Dr Bermudez ordered MRI for pt. Appt coordinated for next available. 3/4 at [place]. Pt given appt details.       Electronically signed by Linda Carlos RN on 2025 at 10:33 AM

## 2025-03-04 ENCOUNTER — HOSPITAL ENCOUNTER (OUTPATIENT)
Dept: MRI IMAGING | Age: 65
Discharge: HOME | End: 2025-03-06
Attending: SURGERY
Payer: COMMERCIAL

## 2025-03-04 ENCOUNTER — OFFICE VISIT (OUTPATIENT)
Dept: ONCOLOGY | Age: 65
End: 2025-03-04
Payer: COMMERCIAL

## 2025-03-04 DIAGNOSIS — C50.411 MALIGNANT NEOPLASM OF UPPER-OUTER QUADRANT OF RIGHT BREAST IN FEMALE, ESTROGEN RECEPTOR POSITIVE (HCC): ICD-10-CM

## 2025-03-04 DIAGNOSIS — Z15.89 CHEK2 GENE MUTATION POSITIVE: Primary | ICD-10-CM

## 2025-03-04 DIAGNOSIS — Z17.0 MALIGNANT NEOPLASM OF UPPER-OUTER QUADRANT OF RIGHT BREAST IN FEMALE, ESTROGEN RECEPTOR POSITIVE (HCC): ICD-10-CM

## 2025-03-04 LAB
CREAT SERPL-MCNC: 0.6 MG/DL (ref 0.5–0.9)
GFR, ESTIMATED: >90 ML/MIN/1.73M2

## 2025-03-04 PROCEDURE — C8908 MRI W/O FOL W/CONT, BREAST,: HCPCS

## 2025-03-04 PROCEDURE — 2500000003 HC RX 250 WO HCPCS: Performed by: SURGERY

## 2025-03-04 PROCEDURE — A9579 GAD-BASE MR CONTRAST NOS,1ML: HCPCS | Performed by: SURGERY

## 2025-03-04 PROCEDURE — 82565 ASSAY OF CREATININE: CPT

## 2025-03-04 PROCEDURE — 6360000004 HC RX CONTRAST MEDICATION: Performed by: SURGERY

## 2025-03-04 PROCEDURE — 2580000003 HC RX 258: Performed by: SURGERY

## 2025-03-04 PROCEDURE — 96041 GENETIC COUNSELING SVC EA 30: CPT | Performed by: GENETIC COUNSELOR, MS

## 2025-03-04 RX ORDER — 0.9 % SODIUM CHLORIDE 0.9 %
40 INTRAVENOUS SOLUTION INTRAVENOUS ONCE
Status: COMPLETED | OUTPATIENT
Start: 2025-03-04 | End: 2025-03-04

## 2025-03-04 RX ORDER — SODIUM CHLORIDE 0.9 % (FLUSH) 0.9 %
10 SYRINGE (ML) INJECTION ONCE
Status: COMPLETED | OUTPATIENT
Start: 2025-03-04 | End: 2025-03-04

## 2025-03-04 RX ADMIN — SODIUM CHLORIDE, PRESERVATIVE FREE 10 ML: 5 INJECTION INTRAVENOUS at 10:36

## 2025-03-04 RX ADMIN — SODIUM CHLORIDE 40 ML: 9 INJECTION, SOLUTION INTRAVENOUS at 10:35

## 2025-03-04 RX ADMIN — GADOTERIDOL 19 ML: 279.3 INJECTION, SOLUTION INTRAVENOUS at 10:35

## 2025-03-05 ENCOUNTER — OFFICE VISIT (OUTPATIENT)
Dept: SURGERY | Age: 65
End: 2025-03-05
Payer: COMMERCIAL

## 2025-03-05 VITALS
DIASTOLIC BLOOD PRESSURE: 72 MMHG | SYSTOLIC BLOOD PRESSURE: 151 MMHG | WEIGHT: 198 LBS | HEART RATE: 76 BPM | HEIGHT: 67 IN | BODY MASS INDEX: 31.08 KG/M2

## 2025-03-05 DIAGNOSIS — Z17.0 MALIGNANT NEOPLASM OF UPPER-OUTER QUADRANT OF RIGHT BREAST IN FEMALE, ESTROGEN RECEPTOR POSITIVE (HCC): Primary | ICD-10-CM

## 2025-03-05 DIAGNOSIS — C50.411 MALIGNANT NEOPLASM OF UPPER-OUTER QUADRANT OF RIGHT BREAST IN FEMALE, ESTROGEN RECEPTOR POSITIVE (HCC): Primary | ICD-10-CM

## 2025-03-05 PROCEDURE — G8427 DOCREV CUR MEDS BY ELIG CLIN: HCPCS | Performed by: PLASTIC SURGERY

## 2025-03-05 PROCEDURE — 1036F TOBACCO NON-USER: CPT | Performed by: PLASTIC SURGERY

## 2025-03-05 PROCEDURE — 99203 OFFICE O/P NEW LOW 30 MIN: CPT | Performed by: PLASTIC SURGERY

## 2025-03-05 PROCEDURE — 3078F DIAST BP <80 MM HG: CPT | Performed by: PLASTIC SURGERY

## 2025-03-05 PROCEDURE — G8417 CALC BMI ABV UP PARAM F/U: HCPCS | Performed by: PLASTIC SURGERY

## 2025-03-05 PROCEDURE — 3077F SYST BP >= 140 MM HG: CPT | Performed by: PLASTIC SURGERY

## 2025-03-05 PROCEDURE — 3017F COLORECTAL CA SCREEN DOC REV: CPT | Performed by: PLASTIC SURGERY

## 2025-03-05 NOTE — PROGRESS NOTES
RIGHT (cm)  25.2 Sternal Notch to Nipple LEFT (cm)26.4   Nipple to IMF RIGHT (cm)  7.2 Nipple to IMF LEFT (cm)10.4   Breast Width RIGHT (cm)                                              17.8 Breast Width LEFT (cm)           21.2                                      Current bra size:  36C Desired bra size 36C       Resection Grams for each Size  32-34 = 100g   36-38 = 200g     42/44 = 300g     44-46 = 400g   BSA GRAMS OF TISSUE TO BE REMOVED PER BREAST    1.40-1.50 218-260   1.51-1.60 261-310   1.61-1.70 311-370   1.71-1.80 371-441   1.81-1.90 442-527   1.91-2.00 528-628   2.01-2.10 629-750   2.11-2.20 751-895   2.21-2.30 896-1068   2.31-2.40 6252-4751   2.41-2.50 9193-6706   2.51-2.60 7536-8491   2.61-2.70 4091-9288   2.7-2.80 2217-8081   2.81-2.90 7476-8172   2.91-3.00 4816-9901       Reconstruction options discussed:   [ ] Tram         [ ] Latissimus   dorsi flap        [ ] Tissue Expanders   [ ]Delayed reconstruction          [ ] Free Flap                 [ ] No Reconstruction    I discussed with the patient that the patient is responsible for obtaining a mammogram prior to any surgical intervention if they have not had a mammogram within 1 year of the scheduled procedure.  Refer to:     Breast reduction options discussed:     [ ] Inferior Pedical                [ ] Superior Pedicle  [ ] Medial Pedicle        [ ] Mastopexy  
surgery to improve your results.        ADDITIONAL ADVISORIES    Smoking, Second-Hand Smoke Exposure, Nicotine Products (Patch, Gum, Nasal Spray):  Patients who are currently smoking or use tobacco or nicotine products (patch, gum, or nasal spray) are at a greater risk for significant surgical complications of skin dying and delayed healing and additional scarring.  Individuals exposed to second-hand smoke are also at potential risk for similar complications attributable to nicotine exposure.  Additionally, smoking may have a significant negative effect on anesthesia and recovery from anesthesia, with coughing and possibly increased bleeding.  Individuals who are not exposed to tobacco smoke or nicotine-containing products have a significantly lower risk of this type of complication.      It is important to refrain from smoking at least 6 weeks before surgery and until your physician states it is safe to return, if desired. I acknowledge that I will inform my physician if I continue to smoke within this time frame, and understand that for my safety, the surgery, if possible, may be delayed.    Smoking may have such a negative effect on your surgery that a urine test just before surgery may be done which will prove the presence of Nicotine. If positive, your surgery may be cancelled and your surgery, scheduling fee, and other prepaid amounts may be forfeited. Honestly disclose smoking to your surgeon.    Sleep Apnea / CPAP:  Individuals who have breathing disorders such as “Obstructive Sleep Apnea” and who may rely upon CPAP devices (constant positive airway pressure) or utilize nighttime oxygen are advised that they are at a substantive risk for respiratory arrest and death when they take narcotic pain medications following surgery. This is an important consideration when evaluating the safety of surgical procedures in terms of very serious complications, including death, that relate to pre-existing medical

## 2025-03-05 NOTE — PROGRESS NOTES
information. Please feel free to contact us at [phone].      A total of 30 minutes were spent face to face with Ms. Coburn and 50% of the time was spent educating and counseling.      Yelena Beatty MS, [facility]   Licensed Genetic Counselor       CC:   MD Linda Rhodes RN Mohammad Alnsour, MD

## 2025-03-06 ENCOUNTER — TELEPHONE (OUTPATIENT)
Dept: ONCOLOGY | Age: 65
End: 2025-03-06

## 2025-03-06 NOTE — TELEPHONE ENCOUNTER
Name: Negra Coburn  : 1960  MRN: [id number]    Oncology Navigation Follow-Up Note    Contact Type:  Telephone    Notes: Writer updated Sydney R, radiology navigator, about order for f/u US. She will contact pt for scheduling. Writer also updated pt on plan, general questions were addressed.       Electronically signed by Linda Carlos RN on 3/6/2025 at 9:02 AM

## 2025-03-07 ENCOUNTER — HOSPITAL ENCOUNTER (OUTPATIENT)
Dept: ULTRASOUND IMAGING | Age: 65
Discharge: HOME | End: 2025-03-09
Payer: COMMERCIAL

## 2025-03-07 DIAGNOSIS — R92.8 ABNORMAL MRI, BREAST: ICD-10-CM

## 2025-03-07 PROCEDURE — 76642 ULTRASOUND BREAST LIMITED: CPT

## 2025-03-10 ENCOUNTER — OFFICE VISIT (OUTPATIENT)
Dept: SURGERY | Age: 65
End: 2025-03-10
Payer: COMMERCIAL

## 2025-03-10 VITALS
SYSTOLIC BLOOD PRESSURE: 190 MMHG | BODY MASS INDEX: 31.39 KG/M2 | WEIGHT: 200 LBS | DIASTOLIC BLOOD PRESSURE: 82 MMHG | HEIGHT: 67 IN | HEART RATE: 70 BPM

## 2025-03-10 DIAGNOSIS — Z17.0 MALIGNANT NEOPLASM OF UPPER-OUTER QUADRANT OF RIGHT BREAST IN FEMALE, ESTROGEN RECEPTOR POSITIVE (HCC): Primary | ICD-10-CM

## 2025-03-10 DIAGNOSIS — C50.411 MALIGNANT NEOPLASM OF UPPER-OUTER QUADRANT OF RIGHT BREAST IN FEMALE, ESTROGEN RECEPTOR POSITIVE (HCC): Primary | ICD-10-CM

## 2025-03-10 PROCEDURE — 3017F COLORECTAL CA SCREEN DOC REV: CPT | Performed by: SURGERY

## 2025-03-10 PROCEDURE — 3079F DIAST BP 80-89 MM HG: CPT | Performed by: SURGERY

## 2025-03-10 PROCEDURE — 3077F SYST BP >= 140 MM HG: CPT | Performed by: SURGERY

## 2025-03-10 PROCEDURE — G8417 CALC BMI ABV UP PARAM F/U: HCPCS | Performed by: SURGERY

## 2025-03-10 PROCEDURE — G8427 DOCREV CUR MEDS BY ELIG CLIN: HCPCS | Performed by: SURGERY

## 2025-03-10 PROCEDURE — 1036F TOBACCO NON-USER: CPT | Performed by: SURGERY

## 2025-03-10 PROCEDURE — 99214 OFFICE O/P EST MOD 30 MIN: CPT | Performed by: SURGERY

## 2025-03-10 NOTE — PROGRESS NOTES
Patient's Name/Date of Birth: Negra Coburn / 1960 (64 y.o.)    Date: March 10, 2025     HPI: Pt is a 64 y.o. female who presents to [facility] for evaluation of ***      GYN HISTORY:  Menarche:  Pregnancy History: G , P , First Child at  years old  Menopause:   HRT:      Physical Exam:  Vitals:    03/10/25 1525   BP: (!) 190/82   Pulse: 70     General:A & O x3  HEENT:  NCAT  BREAST: The bilateral breasts are symmetric with no skin changes, nipple deformity, discharge, or masses.  There is no tenderness to palpation.  Lymph Nodes:  There is no lymphadenopathy in the supraclavicular, infraclavicular, or axillary areas bilaterally.  Extremity: Normal, without deformities, edema, or skin discoloration  SKIN: Skin color, texture, turgor normal. No rashes or lesions.    Assessment/Plan:  No diagnosis found.      Negra Coburn is a 64 y.o. female that is seen today for ***.  My impression is ***.    No follow-ups on file.     Clemente Bermudez MD  3/10/2025 3:32 PM           Past Medical History:   Diagnosis Date    Former smoker     GERD (gastroesophageal reflux disease)     HTN (hypertension)     Pneumonia     Sleep apnea 8/2022    Wears glasses        Past Surgical History:   Procedure Laterality Date    BUNIONECTOMY Right 2015    COLONOSCOPY  03/19/2024    Dr Mark  [facility]    FOOT FRACTURE SURGERY Left 1967    TONSILLECTOMY      as a child    US BREAST BIOPSY W LOC DEVICE 1ST LESION RIGHT Right 2/7/2025    US BREAST BIOPSY W LOC DEVICE 1ST LESION RIGHT 2/7/2025 [facility]     BREAST FINE NEEDLE ASPIRATION Right 2014       Current Outpatient Medications   Medication Sig Dispense Refill    celecoxib (CELEBREX) 200 MG capsule Take 1 capsule by mouth daily 90 capsule 3    lisinopril (PRINIVIL;ZESTRIL) 40 MG tablet TAKE 1 TABLET BY MOUTH EVERY DAY 30 tablet 5    amLODIPine (NORVASC) 5 MG tablet TAKE 1 TABLET BY MOUTH EVERY DAY 90 tablet 1    omeprazole (PRILOSEC) 20 MG delayed release

## 2025-03-10 NOTE — PATIENT INSTRUCTIONS
Will schedule the week of may 12 for bilateral mastectomies with right sentinel lymph node biopsy and tissue expanders  Will have her see dr. Mckinney for neoadjuvant endocrine therapy   Pt has a cruise scheduled for end of April into may.

## 2025-03-10 NOTE — PROGRESS NOTES
Patient's Name/Date of Birth: Negra Coburn / 1960 (65 y.o.)    Date: March 10, 2025    HPI: Pt is a 65 y.o. female who presents to [facility] for a follow-up visit for her right breast cancer and for surgical planning.  She has seen Dr. Martinez of plastic and reconstructive surgery on March 5, 2025.    In the interim since her last visit on February 24, 2025, she has had the following imaging: Bilateral breast MRI with and without contrast on March 4, 2025.  It had the following findings/impression:  IMPRESSION:  1.  Right breast: Biopsy proven invasive lobular carcinoma located at  approximately 1 o'clock left breast 5 cm from the nipple.  Linear area of  enhancement of 2.1 x 0.9 cm at 10 o'clock right breast 4 cm from the nipple,  with ultrasound assessment advised.  6 mm nodular area is noted at o'clock  5-6 cm from the nipple, with additional ultrasound assessment advised; this  is adjacent to the major biopsy lesion and may represent a small satellite  focus.   2.  Left breast: No MRI evidence of malignancy left breast.   BIRADS - CATEGORY 0    She has no complaints today.  She is aware that she needs a repeat ultrasound on her breast after the MRI and that has already been ordered.  Patient will be leaving for cruise scheduled for the end of April into May.      Physical Exam:  Vitals:    03/10/25 1525   BP: (!) 190/82   Pulse: 70     General:A & O x3  No exam done today         Assessment/Plan:  1. Malignant neoplasm of upper-outer quadrant of right breast in female, estrogen receptor positive (HCC)          Negra Coburn is a 65 y.o. female that is seen today for a follow-up visit for her right breast cancer and for surgical planning.  She has seen Dr. Martinez of plastic and reconstructive surgery on March 5, 2025.    We reviewed her pathology and reviewed the images. The discussion for management was based on NCCN guidelines and shared decision making.     The patient is opting

## 2025-03-12 ENCOUNTER — TELEPHONE (OUTPATIENT)
Dept: ONCOLOGY | Age: 65
End: 2025-03-12

## 2025-03-12 NOTE — TELEPHONE ENCOUNTER
Left patient a detailed message explaining that the remainder of the genetics panel was negative for any other clinically significant mutations. Requested that she call back to confirm receipt of the message.     STAT breast panel results are reviewed in the 3/4/25 follow up encounter in her chart. 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

## 2025-03-12 NOTE — TELEPHONE ENCOUNTER
Name: Negra Coburn  : 1960  MRN: [id number]    Oncology Navigation Follow-Up Note    Contact Type:  Telephone    Notes: Pt updated writer that she met with Dr Bermudez and plan is for her surgery to be week of 25. This is due to pts upcoming cruise trip. Writer sent Epic message to Dr Osman updating him on delay in surgery and asked if pt needs to be started on endocrine therapy in the meantime.       Electronically signed by Linda Carlos RN on 3/12/2025 at 9:00 AM

## 2025-03-18 ENCOUNTER — TELEPHONE (OUTPATIENT)
Dept: ONCOLOGY | Age: 65
End: 2025-03-18

## 2025-03-18 ENCOUNTER — OFFICE VISIT (OUTPATIENT)
Dept: ONCOLOGY | Age: 65
End: 2025-03-18
Payer: COMMERCIAL

## 2025-03-18 VITALS
HEART RATE: 70 BPM | SYSTOLIC BLOOD PRESSURE: 135 MMHG | WEIGHT: 203.3 LBS | OXYGEN SATURATION: 99 % | RESPIRATION RATE: 18 BRPM | DIASTOLIC BLOOD PRESSURE: 74 MMHG | TEMPERATURE: 97.8 F | BODY MASS INDEX: 31.84 KG/M2

## 2025-03-18 DIAGNOSIS — C50.211 MALIGNANT NEOPLASM OF UPPER-INNER QUADRANT OF RIGHT BREAST IN FEMALE, ESTROGEN RECEPTOR POSITIVE: Primary | ICD-10-CM

## 2025-03-18 DIAGNOSIS — Z79.811 AROMATASE INHIBITOR USE: ICD-10-CM

## 2025-03-18 DIAGNOSIS — C50.919 INVASIVE LOBULAR CARCINOMA OF BREAST IN FEMALE: Primary | ICD-10-CM

## 2025-03-18 DIAGNOSIS — Z17.0 MALIGNANT NEOPLASM OF UPPER-INNER QUADRANT OF RIGHT BREAST IN FEMALE, ESTROGEN RECEPTOR POSITIVE: Primary | ICD-10-CM

## 2025-03-18 PROCEDURE — 99211 OFF/OP EST MAY X REQ PHY/QHP: CPT | Performed by: INTERNAL MEDICINE

## 2025-03-18 PROCEDURE — 3078F DIAST BP <80 MM HG: CPT | Performed by: INTERNAL MEDICINE

## 2025-03-18 PROCEDURE — G8427 DOCREV CUR MEDS BY ELIG CLIN: HCPCS | Performed by: INTERNAL MEDICINE

## 2025-03-18 PROCEDURE — 1036F TOBACCO NON-USER: CPT | Performed by: INTERNAL MEDICINE

## 2025-03-18 PROCEDURE — G8417 CALC BMI ABV UP PARAM F/U: HCPCS | Performed by: INTERNAL MEDICINE

## 2025-03-18 PROCEDURE — 99215 OFFICE O/P EST HI 40 MIN: CPT | Performed by: INTERNAL MEDICINE

## 2025-03-18 PROCEDURE — 3017F COLORECTAL CA SCREEN DOC REV: CPT | Performed by: INTERNAL MEDICINE

## 2025-03-18 PROCEDURE — 3075F SYST BP GE 130 - 139MM HG: CPT | Performed by: INTERNAL MEDICINE

## 2025-03-18 RX ORDER — LETROZOLE 2.5 MG/1
2.5 TABLET, FILM COATED ORAL DAILY
Qty: 30 TABLET | Refills: 3 | Status: SHIPPED | OUTPATIENT
Start: 2025-03-18

## 2025-03-18 NOTE — TELEPHONE ENCOUNTER
Name: Negra Coburn  : 1960  MRN: [id number]    Oncology Navigation Follow-Up Note    Contact Type:  Telephone    Notes: Pt contacted writer and stated she met with Dr Golden today and they discussed VENKAT flap again. Pt is interested in learning more about this option. SHe wants a referral placed for a seocnd opinion on it. Dr SNEHA Sanches at [facility] does VENKAT flap locally at [facility]. Referral was placed and faxed to [phone]. Pt was also given office number.       Electronically signed by Linda Carlos RN on 3/18/2025 at 3:53 PM

## 2025-03-18 NOTE — PROGRESS NOTES
Spoke with patient regarding no show for his physical therapy evaluation.  Patient was apologetic, states he thought his appointment was Monday.  Informed him we will reschedule his evaluation for Monday, patient agreed.  
nipple contour changes or suspicious calcifications are noted.  Subtly increased density and architectural distortion is noted in the area of  palpable abnormality right breast approximately 12 o'clock middle depth.        Ultrasound:     Targeted ultrasonography over palpable abnormality reveals evidence of an  ill-defined multilobular shadowing solid mass at 12 o'clock 3-5 cm from the  nipple approximately 13 x 16 x 25 mm, suspicious, with poor posterior  acoustic enhancement and areas of shadowing.  Tissue sampling under  ultrasound guidance is advised.  Ultrasound of the right axilla reveals  normal appearing lymph node.     IMPRESSION:  Palpable abnormality corresponds to a suspicious solid multi lobular lesion  at 12 o'clock in the right breast 3-5 cm from the nipple with tissue sampling  under ultrasound guidance advised.  IMPRESSION:   Right-sided breast cancer, clinical stage is T2 N0 M0  Relatively good performance status  Comorbidity include hypertension and obstructive sleep apnea  Starting systemic therapy with aromatase inhibitors as discussed above  PLAN:   I had a long discussion with the patient and family.  They are deciding to delay her surgery for family reasons  We will start systemic therapy with aromatase inhibitors  Baseline bone density will be obtained  Significant discussion about side effects and benefit of aromatase inhibitor  Imaging studies and surgery will be done in May and we will see the degree of response to aromatase inhibitors  The patient is considering doing more aggressive surgery considering the CHEK2 mutation and she already met with the plastic surgeon  I deferred the decision about that type of surgery and reconstruction to the conversation between her and the breast and plastic surgery  Magda Osman MD  Hematologist/Medical Oncologist  [facility]

## 2025-03-20 NOTE — TELEPHONE ENCOUNTER
[facility] called stating they did not receive a complete referral for pt. Referral re-faxed to number provided above with confirmation.

## 2025-03-28 ENCOUNTER — TELEPHONE (OUTPATIENT)
Dept: SURGERY | Age: 65
End: 2025-03-28

## 2025-03-28 NOTE — TELEPHONE ENCOUNTER
Patient not scheduled with Dr. Martinez. Is scheduled for second opinion with Dr. Sanches. Information scanned in chart.

## 2025-04-02 ENCOUNTER — HOSPITAL ENCOUNTER (OUTPATIENT)
Dept: WOMENS IMAGING | Age: 65
Discharge: HOME | End: 2025-04-04
Attending: INTERNAL MEDICINE
Payer: COMMERCIAL

## 2025-04-02 DIAGNOSIS — Z79.811 AROMATASE INHIBITOR USE: ICD-10-CM

## 2025-04-02 DIAGNOSIS — C50.211 MALIGNANT NEOPLASM OF UPPER-INNER QUADRANT OF RIGHT BREAST IN FEMALE, ESTROGEN RECEPTOR POSITIVE: ICD-10-CM

## 2025-04-02 DIAGNOSIS — Z17.0 MALIGNANT NEOPLASM OF UPPER-INNER QUADRANT OF RIGHT BREAST IN FEMALE, ESTROGEN RECEPTOR POSITIVE: ICD-10-CM

## 2025-04-02 PROCEDURE — 77080 DXA BONE DENSITY AXIAL: CPT

## 2025-04-07 ENCOUNTER — TELEPHONE (OUTPATIENT)
Dept: ONCOLOGY | Age: 65
End: 2025-04-07

## 2025-04-07 NOTE — TELEPHONE ENCOUNTER
Name: Negra Coburn  : 1960  MRN: [id number]    Oncology Navigation Follow-Up Note    Contact Type:  Telephone    Notes: Writer spoke with pt who reports appt with Dr Sanches went well. She is waiting to hear back from Dr Bermudez's office about a date for her surgery. Writer will reach out to Deepika to see if  is still the date the offices are planning.     Pt is going on vacation  to .       Electronically signed by Linda Carlos RN on 2025 at 12:36 PM

## 2025-04-08 DIAGNOSIS — K21.9 GASTROESOPHAGEAL REFLUX DISEASE WITHOUT ESOPHAGITIS: ICD-10-CM

## 2025-04-08 RX ORDER — OMEPRAZOLE 20 MG/1
CAPSULE, DELAYED RELEASE ORAL
Qty: 30 CAPSULE | Refills: 5 | Status: SHIPPED | OUTPATIENT
Start: 2025-04-08

## 2025-04-09 ENCOUNTER — TELEPHONE (OUTPATIENT)
Dept: SURGERY | Age: 65
End: 2025-04-09

## 2025-04-09 ENCOUNTER — PREP FOR PROCEDURE (OUTPATIENT)
Dept: SURGERY | Age: 65
End: 2025-04-09

## 2025-04-09 PROBLEM — Z17.0: Status: ACTIVE | Noted: 2025-04-09

## 2025-04-09 PROBLEM — C50.311: Status: ACTIVE | Noted: 2025-04-09

## 2025-04-09 NOTE — TELEPHONE ENCOUNTER
Spoke to patient, surgery scheduled at [facility]. Patient confirmed and information sent to patient(s) padmini.    Surgery date/time: 5/14/25 at 9:30am  Arrival time: 7:30am  PAT: 4/30/25 at 2pm  Post op: 6/2/25 at 2pm

## 2025-04-20 DIAGNOSIS — I10 PRIMARY HYPERTENSION: ICD-10-CM

## 2025-04-20 DIAGNOSIS — I10 ESSENTIAL HYPERTENSION: ICD-10-CM

## 2025-04-21 ENCOUNTER — TELEPHONE (OUTPATIENT)
Dept: ONCOLOGY | Age: 65
End: 2025-04-21

## 2025-04-21 DIAGNOSIS — Z17.0 MALIGNANT NEOPLASM OF LOWER-INNER QUADRANT OF RIGHT BREAST OF FEMALE, ESTROGEN RECEPTOR POSITIVE (HCC): Primary | ICD-10-CM

## 2025-04-21 DIAGNOSIS — C50.311 MALIGNANT NEOPLASM OF LOWER-INNER QUADRANT OF RIGHT BREAST OF FEMALE, ESTROGEN RECEPTOR POSITIVE (HCC): Primary | ICD-10-CM

## 2025-04-21 RX ORDER — LISINOPRIL 40 MG/1
40 TABLET ORAL DAILY
Qty: 90 TABLET | Refills: 3 | Status: SHIPPED | OUTPATIENT
Start: 2025-04-21

## 2025-04-21 RX ORDER — AMLODIPINE BESYLATE 5 MG/1
5 TABLET ORAL DAILY
Qty: 90 TABLET | Refills: 3 | Status: SHIPPED | OUTPATIENT
Start: 2025-04-21

## 2025-04-21 NOTE — TELEPHONE ENCOUNTER
Name: Negra Coburn  : 1960  MRN: [id number]    Oncology Navigation Follow-Up Note    Contact Type:  Telephone    Notes: Pts surgery is scheduled for . Once final pathology report is back an Oncotype order will be faxed to [facility] per Dr Golden. Pt has Med/Onc f/u scheduled for . Writer will ask schedulers if appt can be moved up for 3 weeks post surgery to prevent delay in post op care.     Pt completed a baseline SOZO scan. She will need serial scans every 3 months for 1 year starting in 2025. Writer will ask [facility] schedulers to coordinate this appt as well.       Electronically signed by Linda Carlos RN on 2025 at 12:06 PM

## 2025-04-22 ENCOUNTER — TELEPHONE (OUTPATIENT)
Dept: SURGERY | Age: 65
End: 2025-04-22

## 2025-04-22 NOTE — TELEPHONE ENCOUNTER
Patient called in wanting to follow up on accommodation paperwork that was dropped off on 04.17.2025.  Paperwork was scanned into .      Please call patient at [phone]

## 2025-05-06 ENCOUNTER — HOSPITAL ENCOUNTER (OUTPATIENT)
Dept: PREADMISSION TESTING | Age: 65
Discharge: HOME | End: 2025-05-10
Payer: COMMERCIAL

## 2025-05-06 VITALS
WEIGHT: 202 LBS | HEIGHT: 67 IN | BODY MASS INDEX: 31.71 KG/M2 | HEART RATE: 71 BPM | DIASTOLIC BLOOD PRESSURE: 66 MMHG | RESPIRATION RATE: 18 BRPM | TEMPERATURE: 98.2 F | OXYGEN SATURATION: 98 % | SYSTOLIC BLOOD PRESSURE: 162 MMHG

## 2025-05-06 DIAGNOSIS — Z01.818 PREOP TESTING: Primary | ICD-10-CM

## 2025-05-06 LAB
ANION GAP SERPL CALCULATED.3IONS-SCNC: 9 MMOL/L (ref 9–16)
BASOPHILS # BLD: 0.09 K/UL (ref 0–0.2)
BASOPHILS NFR BLD: 1 % (ref 0–2)
BUN SERPL-MCNC: 18 MG/DL (ref 8–23)
CALCIUM SERPL-MCNC: 9.5 MG/DL (ref 8.6–10.4)
CHLORIDE SERPL-SCNC: 105 MMOL/L (ref 98–107)
CO2 SERPL-SCNC: 28 MMOL/L (ref 20–31)
CREAT SERPL-MCNC: 0.7 MG/DL (ref 0.6–0.9)
EOSINOPHIL # BLD: 0.49 K/UL (ref 0–0.44)
EOSINOPHILS RELATIVE PERCENT: 5 % (ref 1–4)
ERYTHROCYTE [DISTWIDTH] IN BLOOD BY AUTOMATED COUNT: 12.1 % (ref 11.8–14.4)
GFR, ESTIMATED: >90 ML/MIN/1.73M2
GLUCOSE SERPL-MCNC: 111 MG/DL (ref 74–99)
HCT VFR BLD AUTO: 40.4 % (ref 36.3–47.1)
HGB BLD-MCNC: 12.9 G/DL (ref 11.9–15.1)
IMM GRANULOCYTES # BLD AUTO: 0.04 K/UL (ref 0–0.3)
IMM GRANULOCYTES NFR BLD: 0 %
LYMPHOCYTES NFR BLD: 1.61 K/UL (ref 1.1–3.7)
LYMPHOCYTES RELATIVE PERCENT: 17 % (ref 24–43)
MCH RBC QN AUTO: 30.1 PG (ref 25.2–33.5)
MCHC RBC AUTO-ENTMCNC: 31.9 G/DL (ref 28.4–34.8)
MCV RBC AUTO: 94.4 FL (ref 82.6–102.9)
MONOCYTES NFR BLD: 0.59 K/UL (ref 0.1–1.2)
MONOCYTES NFR BLD: 6 % (ref 3–12)
NEUTROPHILS NFR BLD: 71 % (ref 36–65)
NEUTS SEG NFR BLD: 6.96 K/UL (ref 1.5–8.1)
NRBC BLD-RTO: 0 PER 100 WBC
PLATELET # BLD AUTO: 274 K/UL (ref 138–453)
PMV BLD AUTO: 9.7 FL (ref 8.1–13.5)
POTASSIUM SERPL-SCNC: 5.7 MMOL/L (ref 3.7–5.3)
RBC # BLD AUTO: 4.28 M/UL (ref 3.95–5.11)
SODIUM SERPL-SCNC: 142 MMOL/L (ref 136–145)
WBC OTHER # BLD: 9.8 K/UL (ref 3.5–11.3)

## 2025-05-06 PROCEDURE — 36415 COLL VENOUS BLD VENIPUNCTURE: CPT

## 2025-05-06 PROCEDURE — 80048 BASIC METABOLIC PNL TOTAL CA: CPT

## 2025-05-06 PROCEDURE — 93005 ELECTROCARDIOGRAM TRACING: CPT | Performed by: ANESTHESIOLOGY

## 2025-05-06 PROCEDURE — 85025 COMPLETE CBC W/AUTO DIFF WBC: CPT

## 2025-05-06 RX ORDER — ASCORBIC ACID 500 MG
500 TABLET ORAL DAILY
COMMUNITY

## 2025-05-06 RX ORDER — LANOLIN ALCOHOL/MO/W.PET/CERES
1000 CREAM (GRAM) TOPICAL DAILY
COMMUNITY

## 2025-05-06 NOTE — PRE-PROCEDURE INSTRUCTIONS
On the Day of Your Surgery, Wednesday, 5/14/2025, Please Arrive At 0700 AM     Enter the hospital through the Main Entrance, take the lobby elevators to the second floor and check in at the Surgery Registration desk.     Continue to take your home medications as you normally do up to and including the night before surgery with the exception of blood thinning medications.    Blood Thinning Medications:  Please stop prescription blood thinning medications such as Apixaban (Eliquis); Clopidogrel (Plavix); Dabigatran (Pradaxa); Prasugrel (Effient); Rivaroxaban (Xarelto); Ticagrelor (Brilinta); Warfarin (Coumadin) only as directed by your surgeon and/or the prescribing physician    Some common examples of other medications that can thin your blood are: Aspirin, Ibuprofen (Advil, Motrin), Naproxen (Aleve), Meloxicam (Mobic), Celecoxib (Celebrex), Fish Oil, many Herbal Supplements.  These medications should usually be stopped at least 7 days prior to surgery.    Celebrex, multi vitamin.  Contact prescribing doctor regarding Letrozole    Tylenol is OK to take for pain the week prior to surgery.    Failure to stop certain medications may interfere with your scheduled surgery.    If you receive instructions from your surgeon regarding what medications to stop prior to surgery, please follow those specific instructions.    If You Have Diabetes:  Do not take any of your diabetic medications, (injectables or by mouth) the morning of surgery unless otherwise instructed by the doctor who manages your diabetes. If you are taking insulin, contact the doctor the manages your diabetes for instructions about any changes to your insulin dosages the day before surgery.      Please take the following medication(s) the day of surgery with small sips of water:              Amlodipine, omeprazole    Please use your inhaler(s) if needed and bring your inhaler(s) from home the day of surgery.    Showering Before Surgery:     You can play an

## 2025-05-07 ENCOUNTER — ANESTHESIA EVENT (OUTPATIENT)
Dept: OPERATING ROOM | Age: 65
End: 2025-05-07
Payer: COMMERCIAL

## 2025-05-07 LAB
EKG ATRIAL RATE: 71 BPM
EKG P-R INTERVAL: 138 MS
EKG Q-T INTERVAL: 414 MS
EKG QRS DURATION: 88 MS
EKG QTC CALCULATION (BAZETT): 449 MS
EKG R AXIS: 170 DEGREES
EKG T AXIS: 152 DEGREES
EKG VENTRICULAR RATE: 71 BPM

## 2025-05-08 ENCOUNTER — TELEPHONE (OUTPATIENT)
Age: 65
End: 2025-05-08

## 2025-05-08 NOTE — TELEPHONE ENCOUNTER
Name: Negra Coburn  : 1960  MRN: [id number]    Oncology Navigation Follow-Up Note    Contact Type:  Telephone    Notes: Sent pt a text message asking her to contact [facility] [place] office to schedule a post op f/u. Number was provided       Electronically signed by Linda Carlos RN on 2025 at 10:36 AM

## 2025-05-09 SDOH — ECONOMIC STABILITY: FOOD INSECURITY: WITHIN THE PAST 12 MONTHS, YOU WORRIED THAT YOUR FOOD WOULD RUN OUT BEFORE YOU GOT MONEY TO BUY MORE.: NEVER TRUE

## 2025-05-09 SDOH — ECONOMIC STABILITY: FOOD INSECURITY: WITHIN THE PAST 12 MONTHS, THE FOOD YOU BOUGHT JUST DIDN'T LAST AND YOU DIDN'T HAVE MONEY TO GET MORE.: NEVER TRUE

## 2025-05-09 SDOH — ECONOMIC STABILITY: INCOME INSECURITY: IN THE LAST 12 MONTHS, WAS THERE A TIME WHEN YOU WERE NOT ABLE TO PAY THE MORTGAGE OR RENT ON TIME?: NO

## 2025-05-09 NOTE — TELEPHONE ENCOUNTER
Patient called back. She is scheduled for 6/5/25 at 8 am with Dr. Purdy for post op follow up and 8/15/25 at 9:30 for Beny.

## 2025-05-12 ENCOUNTER — HOSPITAL ENCOUNTER (OUTPATIENT)
Age: 65
Setting detail: SPECIMEN
Discharge: HOME | End: 2025-05-12

## 2025-05-12 ENCOUNTER — OFFICE VISIT (OUTPATIENT)
Age: 65
End: 2025-05-12
Payer: COMMERCIAL

## 2025-05-12 VITALS
WEIGHT: 200.4 LBS | RESPIRATION RATE: 16 BRPM | BODY MASS INDEX: 31.45 KG/M2 | DIASTOLIC BLOOD PRESSURE: 66 MMHG | SYSTOLIC BLOOD PRESSURE: 170 MMHG | HEIGHT: 67 IN | HEART RATE: 77 BPM

## 2025-05-12 DIAGNOSIS — I10 ESSENTIAL HYPERTENSION: ICD-10-CM

## 2025-05-12 DIAGNOSIS — I10 ESSENTIAL HYPERTENSION: Primary | ICD-10-CM

## 2025-05-12 DIAGNOSIS — E87.5 HYPERKALEMIA: ICD-10-CM

## 2025-05-12 DIAGNOSIS — Z17.0 MALIGNANT NEOPLASM OF LOWER-INNER QUADRANT OF RIGHT BREAST OF FEMALE, ESTROGEN RECEPTOR POSITIVE (HCC): ICD-10-CM

## 2025-05-12 DIAGNOSIS — C50.311 MALIGNANT NEOPLASM OF LOWER-INNER QUADRANT OF RIGHT BREAST OF FEMALE, ESTROGEN RECEPTOR POSITIVE (HCC): ICD-10-CM

## 2025-05-12 LAB
ALBUMIN SERPL-MCNC: 4.4 G/DL (ref 3.5–5.2)
ALBUMIN/GLOB SERPL: 1.5 {RATIO} (ref 1–2.5)
ALP SERPL-CCNC: 101 U/L (ref 35–104)
ALT SERPL-CCNC: 11 U/L (ref 10–35)
ANION GAP SERPL CALCULATED.3IONS-SCNC: 12 MMOL/L (ref 9–16)
AST SERPL-CCNC: 19 U/L (ref 10–35)
BILIRUB SERPL-MCNC: 0.3 MG/DL (ref 0–1.2)
BUN SERPL-MCNC: 12 MG/DL (ref 8–23)
CALCIUM SERPL-MCNC: 10.1 MG/DL (ref 8.6–10.4)
CHLORIDE SERPL-SCNC: 103 MMOL/L (ref 98–107)
CO2 SERPL-SCNC: 27 MMOL/L (ref 20–31)
CREAT SERPL-MCNC: 0.6 MG/DL (ref 0.6–0.9)
GFR, ESTIMATED: >90 ML/MIN/1.73M2
GLUCOSE SERPL-MCNC: 71 MG/DL (ref 74–99)
MAGNESIUM SERPL-MCNC: 2.2 MG/DL (ref 1.6–2.4)
POTASSIUM SERPL-SCNC: 4.8 MMOL/L (ref 3.7–5.3)
PROT SERPL-MCNC: 7.4 G/DL (ref 6.6–8.7)
SODIUM SERPL-SCNC: 142 MMOL/L (ref 136–145)

## 2025-05-12 PROCEDURE — 3017F COLORECTAL CA SCREEN DOC REV: CPT | Performed by: FAMILY MEDICINE

## 2025-05-12 PROCEDURE — 1036F TOBACCO NON-USER: CPT | Performed by: FAMILY MEDICINE

## 2025-05-12 PROCEDURE — 3077F SYST BP >= 140 MM HG: CPT | Performed by: FAMILY MEDICINE

## 2025-05-12 PROCEDURE — 99214 OFFICE O/P EST MOD 30 MIN: CPT | Performed by: FAMILY MEDICINE

## 2025-05-12 PROCEDURE — G8417 CALC BMI ABV UP PARAM F/U: HCPCS | Performed by: FAMILY MEDICINE

## 2025-05-12 PROCEDURE — G8427 DOCREV CUR MEDS BY ELIG CLIN: HCPCS | Performed by: FAMILY MEDICINE

## 2025-05-12 PROCEDURE — 3078F DIAST BP <80 MM HG: CPT | Performed by: FAMILY MEDICINE

## 2025-05-12 RX ORDER — LISINOPRIL 40 MG/1
20 TABLET ORAL DAILY
Qty: 90 TABLET | Refills: 3
Start: 2025-05-12

## 2025-05-12 RX ORDER — AMLODIPINE BESYLATE 5 MG/1
5 TABLET ORAL 2 TIMES DAILY
Qty: 180 TABLET | Refills: 3 | Status: SHIPPED | OUTPATIENT
Start: 2025-05-12

## 2025-05-12 RX ORDER — FUROSEMIDE 20 MG/1
20 TABLET ORAL DAILY
Qty: 60 TABLET | Refills: 3 | Status: SHIPPED | OUTPATIENT
Start: 2025-05-12

## 2025-05-12 NOTE — PROGRESS NOTES
[facility]  [address] [place] [address]  Phone: [phone]  Fax: [phone]      Date of Visit:  25   Patient Name: Negra Coburn   Patient :  1960     ASSESSMENT/PLAN     1. Essential hypertension  -     Comprehensive Metabolic Panel; Future  -     lisinopril (PRINIVIL;ZESTRIL) 40 MG tablet; Take 0.5 tablets by mouth daily, Disp-90 tablet, R-3NO PRINT  -     amLODIPine (NORVASC) 5 MG tablet; Take 1 tablet by mouth 2 times daily, Disp-180 tablet, R-3Normal  -     furosemide (LASIX) 20 MG tablet; Take 1 tablet by mouth daily, Disp-60 tablet, R-3Normal  2. Malignant neoplasm of lower-inner quadrant of right breast of female, estrogen receptor positive (HCC)  3. Hyperkalemia  -     Comprehensive Metabolic Panel; Future  -     Magnesium; Future  -     furosemide (LASIX) 20 MG tablet; Take 1 tablet by mouth daily, Disp-60 tablet, R-3Normal     Assessment & Plan  1. Breast cancer.  She has been diagnosed with right breast cancer and is scheduled for a bilateral mastectomy in 2 days. She has expressed a preference for symmetry in her breasts post-surgery. She has been informed that she will likely have drains in place following the procedure. She has a history of regular screening mammograms. An MRI revealed additional suspicious areas, prompting an ultrasound at [facility]. The ultrasound technician recommended biopsies due to the presence of other evident spots. Dr. Bermudez advised that the tumor was sufficiently large to necessitate the removal of a significant portion of her breast. Despite the absence of cancer in her left breast, she has chosen to undergo a bilateral mastectomy. She received a letter from her insurance company approving the right breast mastectomy but did not mention the left breast, causing confusion. She contacted Dr. Bermudez's office, and they agreed to reach out to her insurance company. Her  also

## 2025-05-13 ENCOUNTER — RESULTS FOLLOW-UP (OUTPATIENT)
Age: 65
End: 2025-05-13

## 2025-05-13 RX ORDER — SODIUM CHLORIDE 9 MG/ML
INJECTION, SOLUTION INTRAVENOUS PRN
Status: CANCELLED | OUTPATIENT
Start: 2025-05-13

## 2025-05-13 RX ORDER — SODIUM CHLORIDE 0.9 % (FLUSH) 0.9 %
5-40 SYRINGE (ML) INJECTION PRN
Status: CANCELLED | OUTPATIENT
Start: 2025-05-13

## 2025-05-13 RX ORDER — CEFAZOLIN SODIUM/WATER 2 G/20 ML
2000 SYRINGE (ML) INTRAVENOUS
Status: CANCELLED | OUTPATIENT
Start: 2025-05-13 | End: 2025-05-13

## 2025-05-13 RX ORDER — SODIUM CHLORIDE 0.9 % (FLUSH) 0.9 %
5-40 SYRINGE (ML) INJECTION EVERY 12 HOURS SCHEDULED
Status: CANCELLED | OUTPATIENT
Start: 2025-05-13

## 2025-05-13 RX ORDER — SODIUM CHLORIDE 9 MG/ML
INJECTION, SOLUTION INTRAVENOUS CONTINUOUS
Status: CANCELLED | OUTPATIENT
Start: 2025-05-13

## 2025-05-14 ENCOUNTER — ANESTHESIA (OUTPATIENT)
Dept: OPERATING ROOM | Age: 65
End: 2025-05-14
Payer: COMMERCIAL

## 2025-05-14 ENCOUNTER — HOSPITAL ENCOUNTER (OUTPATIENT)
Age: 65
Discharge: HOME | End: 2025-05-15
Attending: SURGERY | Admitting: SURGERY
Payer: COMMERCIAL

## 2025-05-14 ENCOUNTER — HOSPITAL ENCOUNTER (OUTPATIENT)
Dept: NUCLEAR MEDICINE | Age: 65
Discharge: HOME | End: 2025-05-16
Payer: COMMERCIAL

## 2025-05-14 DIAGNOSIS — C50.411 MALIGNANT NEOPLASM OF UPPER-OUTER QUADRANT OF RIGHT BREAST IN FEMALE, ESTROGEN RECEPTOR POSITIVE (HCC): Primary | ICD-10-CM

## 2025-05-14 DIAGNOSIS — Z17.0: ICD-10-CM

## 2025-05-14 DIAGNOSIS — Z17.0 MALIGNANT NEOPLASM OF UPPER-OUTER QUADRANT OF RIGHT BREAST IN FEMALE, ESTROGEN RECEPTOR POSITIVE (HCC): Primary | ICD-10-CM

## 2025-05-14 DIAGNOSIS — C50.311: ICD-10-CM

## 2025-05-14 PROCEDURE — 2720000010 HC SURG SUPPLY STERILE: Performed by: SURGERY

## 2025-05-14 PROCEDURE — 6370000000 HC RX 637 (ALT 250 FOR IP): Performed by: SURGERY

## 2025-05-14 PROCEDURE — 7100000001 HC PACU RECOVERY - ADDTL 15 MIN: Performed by: SURGERY

## 2025-05-14 PROCEDURE — 2500000003 HC RX 250 WO HCPCS: Performed by: NURSE ANESTHETIST, CERTIFIED REGISTERED

## 2025-05-14 PROCEDURE — 3430000000 HC RX DIAGNOSTIC RADIOPHARMACEUTICAL: Performed by: SURGERY

## 2025-05-14 PROCEDURE — 2709999900 HC NON-CHARGEABLE SUPPLY: Performed by: SURGERY

## 2025-05-14 PROCEDURE — 6360000002 HC RX W HCPCS: Performed by: NURSE ANESTHETIST, CERTIFIED REGISTERED

## 2025-05-14 PROCEDURE — 7100000000 HC PACU RECOVERY - FIRST 15 MIN: Performed by: SURGERY

## 2025-05-14 PROCEDURE — 6360000002 HC RX W HCPCS: Performed by: SURGERY

## 2025-05-14 PROCEDURE — C1789 PROSTHESIS, BREAST, IMP: HCPCS | Performed by: SURGERY

## 2025-05-14 PROCEDURE — 2580000003 HC RX 258: Performed by: ANESTHESIOLOGY

## 2025-05-14 PROCEDURE — 38900 IO MAP OF SENT LYMPH NODE: CPT | Performed by: SURGERY

## 2025-05-14 PROCEDURE — 6360000002 HC RX W HCPCS: Performed by: ANESTHESIOLOGY

## 2025-05-14 PROCEDURE — 3700000001 HC ADD 15 MINUTES (ANESTHESIA): Performed by: SURGERY

## 2025-05-14 PROCEDURE — 6370000000 HC RX 637 (ALT 250 FOR IP): Performed by: ANESTHESIOLOGY

## 2025-05-14 PROCEDURE — C1781 MESH (IMPLANTABLE): HCPCS | Performed by: SURGERY

## 2025-05-14 PROCEDURE — 76942 ECHO GUIDE FOR BIOPSY: CPT | Performed by: ANESTHESIOLOGY

## 2025-05-14 PROCEDURE — 3600000002 HC SURGERY LEVEL 2 BASE: Performed by: SURGERY

## 2025-05-14 PROCEDURE — 3700000000 HC ANESTHESIA ATTENDED CARE: Performed by: SURGERY

## 2025-05-14 PROCEDURE — 88307 TISSUE EXAM BY PATHOLOGIST: CPT

## 2025-05-14 PROCEDURE — A9520 TC99 TILMANOCEPT DIAG 0.5MCI: HCPCS | Performed by: SURGERY

## 2025-05-14 PROCEDURE — 3600000012 HC SURGERY LEVEL 2 ADDTL 15MIN: Performed by: SURGERY

## 2025-05-14 PROCEDURE — 19303 MAST SIMPLE COMPLETE: CPT | Performed by: SURGERY

## 2025-05-14 PROCEDURE — 2500000003 HC RX 250 WO HCPCS: Performed by: SURGERY

## 2025-05-14 PROCEDURE — 38525 BIOPSY/REMOVAL LYMPH NODES: CPT | Performed by: SURGERY

## 2025-05-14 PROCEDURE — 88342 IMHCHEM/IMCYTCHM 1ST ANTB: CPT

## 2025-05-14 DEVICE — MESH SURG SZ 20 X 25 IN POLYDIOXANONE MACROPOROUS MONOFILAME: Type: IMPLANTABLE DEVICE | Site: BREAST | Status: FUNCTIONAL

## 2025-05-14 RX ORDER — FENTANYL CITRATE 50 UG/ML
25 INJECTION, SOLUTION INTRAMUSCULAR; INTRAVENOUS EVERY 5 MIN PRN
Status: DISCONTINUED | OUTPATIENT
Start: 2025-05-14 | End: 2025-05-14 | Stop reason: HOSPADM

## 2025-05-14 RX ORDER — DEXAMETHASONE SODIUM PHOSPHATE 10 MG/ML
INJECTION, SOLUTION INTRAMUSCULAR; INTRAVENOUS
Status: DISCONTINUED | OUTPATIENT
Start: 2025-05-14 | End: 2025-05-14 | Stop reason: SDUPTHER

## 2025-05-14 RX ORDER — LIDOCAINE 40 MG/G
CREAM TOPICAL ONCE
Status: COMPLETED | OUTPATIENT
Start: 2025-05-14 | End: 2025-05-14

## 2025-05-14 RX ORDER — SCOPOLAMINE 1 MG/3D
1 PATCH, EXTENDED RELEASE TRANSDERMAL ONCE
Status: DISCONTINUED | OUTPATIENT
Start: 2025-05-14 | End: 2025-05-15 | Stop reason: HOSPADM

## 2025-05-14 RX ORDER — SODIUM CHLORIDE 0.9 % (FLUSH) 0.9 %
5-40 SYRINGE (ML) INJECTION EVERY 12 HOURS SCHEDULED
Status: DISCONTINUED | OUTPATIENT
Start: 2025-05-14 | End: 2025-05-15 | Stop reason: HOSPADM

## 2025-05-14 RX ORDER — BISACODYL 5 MG/1
5 TABLET, DELAYED RELEASE ORAL DAILY
Status: DISCONTINUED | OUTPATIENT
Start: 2025-05-14 | End: 2025-05-15 | Stop reason: HOSPADM

## 2025-05-14 RX ORDER — ALBUTEROL SULFATE 90 UG/1
2 INHALANT RESPIRATORY (INHALATION) EVERY 6 HOURS PRN
Status: DISCONTINUED | OUTPATIENT
Start: 2025-05-14 | End: 2025-05-15 | Stop reason: HOSPADM

## 2025-05-14 RX ORDER — HYDROMORPHONE HYDROCHLORIDE 1 MG/ML
0.5 INJECTION, SOLUTION INTRAMUSCULAR; INTRAVENOUS; SUBCUTANEOUS EVERY 5 MIN PRN
Status: DISCONTINUED | OUTPATIENT
Start: 2025-05-14 | End: 2025-05-14 | Stop reason: HOSPADM

## 2025-05-14 RX ORDER — NALOXONE HYDROCHLORIDE 0.4 MG/ML
INJECTION, SOLUTION INTRAMUSCULAR; INTRAVENOUS; SUBCUTANEOUS PRN
Status: DISCONTINUED | OUTPATIENT
Start: 2025-05-14 | End: 2025-05-14 | Stop reason: HOSPADM

## 2025-05-14 RX ORDER — HYDROCODONE BITARTRATE AND ACETAMINOPHEN 5; 325 MG/1; MG/1
1 TABLET ORAL EVERY 6 HOURS PRN
Status: DISCONTINUED | OUTPATIENT
Start: 2025-05-14 | End: 2025-05-14

## 2025-05-14 RX ORDER — PANTOPRAZOLE SODIUM 40 MG/1
40 TABLET, DELAYED RELEASE ORAL
Status: DISCONTINUED | OUTPATIENT
Start: 2025-05-15 | End: 2025-05-15 | Stop reason: HOSPADM

## 2025-05-14 RX ORDER — SODIUM CHLORIDE 0.9 % (FLUSH) 0.9 %
5-40 SYRINGE (ML) INJECTION EVERY 12 HOURS SCHEDULED
Status: DISCONTINUED | OUTPATIENT
Start: 2025-05-14 | End: 2025-05-14 | Stop reason: HOSPADM

## 2025-05-14 RX ORDER — MIDAZOLAM HYDROCHLORIDE 1 MG/ML
INJECTION, SOLUTION INTRAMUSCULAR; INTRAVENOUS
Status: DISCONTINUED | OUTPATIENT
Start: 2025-05-14 | End: 2025-05-14 | Stop reason: SDUPTHER

## 2025-05-14 RX ORDER — OXYCODONE HYDROCHLORIDE 5 MG/1
5 TABLET ORAL
Status: DISCONTINUED | OUTPATIENT
Start: 2025-05-14 | End: 2025-05-14 | Stop reason: HOSPADM

## 2025-05-14 RX ORDER — SODIUM CHLORIDE 9 MG/ML
INJECTION, SOLUTION INTRAVENOUS PRN
Status: DISCONTINUED | OUTPATIENT
Start: 2025-05-14 | End: 2025-05-14 | Stop reason: HOSPADM

## 2025-05-14 RX ORDER — LIDOCAINE HYDROCHLORIDE 10 MG/ML
1 INJECTION, SOLUTION EPIDURAL; INFILTRATION; INTRACAUDAL; PERINEURAL
Status: DISCONTINUED | OUTPATIENT
Start: 2025-05-15 | End: 2025-05-14 | Stop reason: HOSPADM

## 2025-05-14 RX ORDER — AMLODIPINE BESYLATE 5 MG/1
5 TABLET ORAL 2 TIMES DAILY
Status: DISCONTINUED | OUTPATIENT
Start: 2025-05-14 | End: 2025-05-15 | Stop reason: HOSPADM

## 2025-05-14 RX ORDER — CEPHALEXIN 500 MG/1
500 CAPSULE ORAL 2 TIMES DAILY
Qty: 28 CAPSULE | Refills: 0 | Status: SHIPPED | OUTPATIENT
Start: 2025-05-14 | End: 2025-05-28

## 2025-05-14 RX ORDER — ONDANSETRON 4 MG/1
4 TABLET, ORALLY DISINTEGRATING ORAL 3 TIMES DAILY PRN
Qty: 21 TABLET | Refills: 0 | Status: SHIPPED | OUTPATIENT
Start: 2025-05-14

## 2025-05-14 RX ORDER — ONDANSETRON 2 MG/ML
4 INJECTION INTRAMUSCULAR; INTRAVENOUS EVERY 6 HOURS PRN
Status: DISCONTINUED | OUTPATIENT
Start: 2025-05-14 | End: 2025-05-15 | Stop reason: HOSPADM

## 2025-05-14 RX ORDER — PHENYLEPHRINE HCL IN 0.9% NACL 1 MG/10 ML
SYRINGE (ML) INTRAVENOUS
Status: DISCONTINUED | OUTPATIENT
Start: 2025-05-14 | End: 2025-05-14 | Stop reason: SDUPTHER

## 2025-05-14 RX ORDER — ROCURONIUM BROMIDE 10 MG/ML
INJECTION, SOLUTION INTRAVENOUS
Status: DISCONTINUED | OUTPATIENT
Start: 2025-05-14 | End: 2025-05-14 | Stop reason: SDUPTHER

## 2025-05-14 RX ORDER — SODIUM CHLORIDE 0.9 % (FLUSH) 0.9 %
5-40 SYRINGE (ML) INJECTION PRN
Status: DISCONTINUED | OUTPATIENT
Start: 2025-05-14 | End: 2025-05-14 | Stop reason: HOSPADM

## 2025-05-14 RX ORDER — LIDOCAINE HYDROCHLORIDE 20 MG/ML
INJECTION, SOLUTION EPIDURAL; INFILTRATION; INTRACAUDAL; PERINEURAL
Status: DISCONTINUED | OUTPATIENT
Start: 2025-05-14 | End: 2025-05-14 | Stop reason: SDUPTHER

## 2025-05-14 RX ORDER — SODIUM CHLORIDE, SODIUM LACTATE, POTASSIUM CHLORIDE, CALCIUM CHLORIDE 600; 310; 30; 20 MG/100ML; MG/100ML; MG/100ML; MG/100ML
INJECTION, SOLUTION INTRAVENOUS CONTINUOUS
Status: DISCONTINUED | OUTPATIENT
Start: 2025-05-14 | End: 2025-05-14 | Stop reason: HOSPADM

## 2025-05-14 RX ORDER — FUROSEMIDE 20 MG/1
20 TABLET ORAL DAILY
Status: DISCONTINUED | OUTPATIENT
Start: 2025-05-14 | End: 2025-05-15 | Stop reason: HOSPADM

## 2025-05-14 RX ORDER — BACLOFEN 10 MG/1
10 TABLET ORAL 3 TIMES DAILY PRN
Status: DISCONTINUED | OUTPATIENT
Start: 2025-05-14 | End: 2025-05-15 | Stop reason: HOSPADM

## 2025-05-14 RX ORDER — FENTANYL CITRATE 50 UG/ML
INJECTION, SOLUTION INTRAMUSCULAR; INTRAVENOUS
Status: DISCONTINUED | OUTPATIENT
Start: 2025-05-14 | End: 2025-05-14 | Stop reason: SDUPTHER

## 2025-05-14 RX ORDER — LISINOPRIL 20 MG/1
20 TABLET ORAL DAILY
Status: DISCONTINUED | OUTPATIENT
Start: 2025-05-14 | End: 2025-05-15 | Stop reason: HOSPADM

## 2025-05-14 RX ORDER — APREPITANT 40 MG/1
40 CAPSULE ORAL ONCE
Status: COMPLETED | OUTPATIENT
Start: 2025-05-14 | End: 2025-05-14

## 2025-05-14 RX ORDER — SODIUM CHLORIDE 9 MG/ML
INJECTION, SOLUTION INTRAVENOUS CONTINUOUS
Status: DISCONTINUED | OUTPATIENT
Start: 2025-05-14 | End: 2025-05-14 | Stop reason: HOSPADM

## 2025-05-14 RX ORDER — BACLOFEN 10 MG/1
10 TABLET ORAL 3 TIMES DAILY PRN
Qty: 21 TABLET | Refills: 0 | Status: SHIPPED | OUTPATIENT
Start: 2025-05-14

## 2025-05-14 RX ORDER — HYDROCODONE BITARTRATE AND ACETAMINOPHEN 5; 325 MG/1; MG/1
2 TABLET ORAL EVERY 4 HOURS PRN
Status: DISCONTINUED | OUTPATIENT
Start: 2025-05-14 | End: 2025-05-15 | Stop reason: HOSPADM

## 2025-05-14 RX ORDER — ONDANSETRON 2 MG/ML
INJECTION INTRAMUSCULAR; INTRAVENOUS
Status: DISCONTINUED | OUTPATIENT
Start: 2025-05-14 | End: 2025-05-14 | Stop reason: SDUPTHER

## 2025-05-14 RX ORDER — METOCLOPRAMIDE HYDROCHLORIDE 5 MG/ML
10 INJECTION INTRAMUSCULAR; INTRAVENOUS
Status: DISCONTINUED | OUTPATIENT
Start: 2025-05-14 | End: 2025-05-14 | Stop reason: HOSPADM

## 2025-05-14 RX ORDER — ONDANSETRON 4 MG/1
4 TABLET, ORALLY DISINTEGRATING ORAL EVERY 8 HOURS PRN
Status: DISCONTINUED | OUTPATIENT
Start: 2025-05-14 | End: 2025-05-15 | Stop reason: HOSPADM

## 2025-05-14 RX ORDER — ISOSULFAN BLUE 50 MG/5ML
INJECTION, SOLUTION SUBCUTANEOUS PRN
Status: DISCONTINUED | OUTPATIENT
Start: 2025-05-14 | End: 2025-05-14 | Stop reason: ALTCHOICE

## 2025-05-14 RX ORDER — PROPOFOL 10 MG/ML
INJECTION, EMULSION INTRAVENOUS
Status: DISCONTINUED | OUTPATIENT
Start: 2025-05-14 | End: 2025-05-14 | Stop reason: SDUPTHER

## 2025-05-14 RX ORDER — ACETAMINOPHEN 325 MG/1
650 TABLET ORAL EVERY 6 HOURS
Status: DISCONTINUED | OUTPATIENT
Start: 2025-05-14 | End: 2025-05-14

## 2025-05-14 RX ORDER — BUPIVACAINE HYDROCHLORIDE 2.5 MG/ML
INJECTION, SOLUTION EPIDURAL; INFILTRATION; INTRACAUDAL; PERINEURAL
Status: COMPLETED | OUTPATIENT
Start: 2025-05-14 | End: 2025-05-14

## 2025-05-14 RX ORDER — HYDROCODONE BITARTRATE AND ACETAMINOPHEN 5; 325 MG/1; MG/1
1 TABLET ORAL EVERY 4 HOURS PRN
Status: DISCONTINUED | OUTPATIENT
Start: 2025-05-14 | End: 2025-05-15 | Stop reason: HOSPADM

## 2025-05-14 RX ORDER — HYDROCODONE BITARTRATE AND ACETAMINOPHEN 5; 325 MG/1; MG/1
1 TABLET ORAL EVERY 6 HOURS PRN
Qty: 12 TABLET | Refills: 0 | Status: SHIPPED | OUTPATIENT
Start: 2025-05-14 | End: 2025-05-17

## 2025-05-14 RX ORDER — ONDANSETRON 2 MG/ML
4 INJECTION INTRAMUSCULAR; INTRAVENOUS
Status: DISCONTINUED | OUTPATIENT
Start: 2025-05-14 | End: 2025-05-14 | Stop reason: HOSPADM

## 2025-05-14 RX ORDER — DEXTROSE MONOHYDRATE, SODIUM CHLORIDE, AND POTASSIUM CHLORIDE 50; 1.49; 4.5 G/1000ML; G/1000ML; G/1000ML
INJECTION, SOLUTION INTRAVENOUS CONTINUOUS
Status: DISCONTINUED | OUTPATIENT
Start: 2025-05-14 | End: 2025-05-15 | Stop reason: HOSPADM

## 2025-05-14 RX ADMIN — Medication 100 MCG: at 10:08

## 2025-05-14 RX ADMIN — Medication 100 MCG: at 11:23

## 2025-05-14 RX ADMIN — DEXAMETHASONE SODIUM PHOSPHATE 10 MG: 10 INJECTION INTRAMUSCULAR; INTRAVENOUS at 09:52

## 2025-05-14 RX ADMIN — LIDOCAINE 4%: 4 CREAM TOPICAL at 07:09

## 2025-05-14 RX ADMIN — HYDROMORPHONE HYDROCHLORIDE 0.5 MG: 1 INJECTION, SOLUTION INTRAMUSCULAR; INTRAVENOUS; SUBCUTANEOUS at 14:23

## 2025-05-14 RX ADMIN — Medication 100 MCG: at 12:10

## 2025-05-14 RX ADMIN — FENTANYL CITRATE 25 MCG: 50 INJECTION INTRAMUSCULAR; INTRAVENOUS at 14:07

## 2025-05-14 RX ADMIN — BUPIVACAINE HYDROCHLORIDE 60 ML: 2.5 INJECTION, SOLUTION EPIDURAL; INFILTRATION; INTRACAUDAL; PERINEURAL at 10:06

## 2025-05-14 RX ADMIN — FENTANYL CITRATE 50 MCG: 50 INJECTION INTRAMUSCULAR; INTRAVENOUS at 09:54

## 2025-05-14 RX ADMIN — PROPOFOL 50 MCG/KG/MIN: 10 INJECTION, EMULSION INTRAVENOUS at 09:55

## 2025-05-14 RX ADMIN — BACLOFEN 10 MG: 10 TABLET ORAL at 16:25

## 2025-05-14 RX ADMIN — Medication 100 MCG: at 10:16

## 2025-05-14 RX ADMIN — PROPOFOL 200 MG: 10 INJECTION, EMULSION INTRAVENOUS at 09:47

## 2025-05-14 RX ADMIN — TILMANOCEPT 600 MICRO CURIE: KIT at 08:50

## 2025-05-14 RX ADMIN — APREPITANT 40 MG: 40 CAPSULE ORAL at 07:55

## 2025-05-14 RX ADMIN — Medication 100 MCG: at 10:29

## 2025-05-14 RX ADMIN — Medication 2000 MG: at 16:22

## 2025-05-14 RX ADMIN — LIDOCAINE HYDROCHLORIDE 80 MG: 20 INJECTION, SOLUTION EPIDURAL; INFILTRATION; INTRACAUDAL; PERINEURAL at 09:47

## 2025-05-14 RX ADMIN — ROCURONIUM BROMIDE 50 MG: 10 INJECTION, SOLUTION INTRAVENOUS at 09:47

## 2025-05-14 RX ADMIN — FENTANYL CITRATE 25 MCG: 50 INJECTION INTRAMUSCULAR; INTRAVENOUS at 13:59

## 2025-05-14 RX ADMIN — LISINOPRIL 20 MG: 20 TABLET ORAL at 17:55

## 2025-05-14 RX ADMIN — BISACODYL 5 MG: 5 TABLET, COATED ORAL at 15:46

## 2025-05-14 RX ADMIN — SODIUM CHLORIDE, POTASSIUM CHLORIDE, SODIUM LACTATE AND CALCIUM CHLORIDE: 600; 310; 30; 20 INJECTION, SOLUTION INTRAVENOUS at 07:10

## 2025-05-14 RX ADMIN — HYDROCODONE BITARTRATE AND ACETAMINOPHEN 2 TABLET: 5; 325 TABLET ORAL at 20:44

## 2025-05-14 RX ADMIN — AMLODIPINE BESYLATE 5 MG: 5 TABLET ORAL at 20:44

## 2025-05-14 RX ADMIN — ONDANSETRON 4 MG: 2 INJECTION, SOLUTION INTRAMUSCULAR; INTRAVENOUS at 13:15

## 2025-05-14 RX ADMIN — Medication 100 MCG: at 12:24

## 2025-05-14 RX ADMIN — DEXTROSE MONOHYDRATE, SODIUM CHLORIDE, AND POTASSIUM CHLORIDE: 50; 4.5; 1.49 INJECTION, SOLUTION INTRAVENOUS at 15:53

## 2025-05-14 RX ADMIN — SUGAMMADEX 183 MG: 100 INJECTION, SOLUTION INTRAVENOUS at 13:22

## 2025-05-14 RX ADMIN — SODIUM CHLORIDE, POTASSIUM CHLORIDE, SODIUM LACTATE AND CALCIUM CHLORIDE: 600; 310; 30; 20 INJECTION, SOLUTION INTRAVENOUS at 12:10

## 2025-05-14 RX ADMIN — HYDROCODONE BITARTRATE AND ACETAMINOPHEN 2 TABLET: 5; 325 TABLET ORAL at 15:45

## 2025-05-14 RX ADMIN — MIDAZOLAM 2 MG: 1 INJECTION INTRAMUSCULAR; INTRAVENOUS at 09:40

## 2025-05-14 RX ADMIN — Medication 100 MCG: at 12:58

## 2025-05-14 RX ADMIN — Medication 2000 MG: at 09:54

## 2025-05-14 RX ADMIN — FENTANYL CITRATE 25 MCG: 50 INJECTION INTRAMUSCULAR; INTRAVENOUS at 14:14

## 2025-05-14 RX ADMIN — FUROSEMIDE 20 MG: 20 TABLET ORAL at 17:55

## 2025-05-14 RX ADMIN — HYDROMORPHONE HYDROCHLORIDE 0.5 MG: 1 INJECTION, SOLUTION INTRAMUSCULAR; INTRAVENOUS; SUBCUTANEOUS at 14:38

## 2025-05-14 ASSESSMENT — PAIN DESCRIPTION - LOCATION
LOCATION: BREAST
LOCATION: BREAST;CHEST
LOCATION: BREAST

## 2025-05-14 ASSESSMENT — PAIN - FUNCTIONAL ASSESSMENT
PAIN_FUNCTIONAL_ASSESSMENT: 0-10
PAIN_FUNCTIONAL_ASSESSMENT: PREVENTS OR INTERFERES SOME ACTIVE ACTIVITIES AND ADLS

## 2025-05-14 ASSESSMENT — PAIN SCALES - GENERAL
PAINLEVEL_OUTOF10: 6
PAINLEVEL_OUTOF10: 8
PAINLEVEL_OUTOF10: 9
PAINLEVEL_OUTOF10: 7
PAINLEVEL_OUTOF10: 10
PAINLEVEL_OUTOF10: 10
PAINLEVEL_OUTOF10: 9
PAINLEVEL_OUTOF10: 4
PAINLEVEL_OUTOF10: 10
PAINLEVEL_OUTOF10: 4

## 2025-05-14 ASSESSMENT — PAIN DESCRIPTION - FREQUENCY
FREQUENCY: CONTINUOUS

## 2025-05-14 ASSESSMENT — ENCOUNTER SYMPTOMS
ABDOMINAL DISTENTION: 0
CONSTIPATION: 0
COUGH: 0
ABDOMINAL PAIN: 0
CHEST TIGHTNESS: 0
SHORTNESS OF BREATH: 0
RHINORRHEA: 0
VOMITING: 0
NAUSEA: 0
WHEEZING: 0
DIARRHEA: 0
SORE THROAT: 0

## 2025-05-14 ASSESSMENT — PAIN DESCRIPTION - ONSET
ONSET: ON-GOING

## 2025-05-14 ASSESSMENT — LIFESTYLE VARIABLES: SMOKING_STATUS: 0

## 2025-05-14 ASSESSMENT — PAIN DESCRIPTION - ORIENTATION
ORIENTATION: RIGHT;LEFT
ORIENTATION: RIGHT;LEFT;ANTERIOR

## 2025-05-14 ASSESSMENT — PAIN DESCRIPTION - PAIN TYPE
TYPE: SURGICAL PAIN

## 2025-05-14 ASSESSMENT — PAIN DESCRIPTION - DESCRIPTORS
DESCRIPTORS: SHARP;PRESSURE
DESCRIPTORS: ACHING;SHARP
DESCRIPTORS: SORE
DESCRIPTORS: SHARP;SPASM

## 2025-05-14 NOTE — PROGRESS NOTES
Pt. Originally reporting pain at a 6 and was given a dose of Fentanyl. Pain for Pt. Proceeds to increase and by passing the pain rating for fentanyl use. Dr. Bermudez at bedside tells RN to give Dilaudid which has been ordered for post op care. Pt. Progressing pain is reason for increase in pain med.

## 2025-05-14 NOTE — OP NOTE
Operative Note      Patient: Negra Coburn  YOB: 1960  MRN: [id number]    Date of Procedure: 5/14/2025    Pre-Op Diagnosis Codes:      * Malignant neoplasm of lower-inner quadrant of right breast, estrogen receptor positive (HCC) [C50.311, Z17.0]    Post-Op Diagnosis: Same and acquired absence of both breasts and nipples.        Procedure(s):  BBILATERAL BREAST MASTECTOMY RECONSTRUCTION WITH TISSUE EXPANDER PLACEMENT and IMPLANTATION OF bioprosthetic MESH    Surgeon(s):  Clemente Bermudez MD Kocak, Ergun, MD Kocak, Ergun, MD    Assistant:   Moni Nichols physician assistant.  Mae assisted throughout the entire procedure including set up of the operation with prepping, draping, and positioning of the patient.  She also assisted throughout the entire procedure with retraction exposure of tissues only performed above procedures, and finally with closure of all the incisions under my direct supervision.  Attest that no other qualified individuals were available to assist throughout this entire procedure.    Anesthesia: General    Estimated Blood Loss (mL): less than 50     Complications: None    Specimens:   ID Type Source Tests Collected by Time Destination   A : RIGHT BREAST SENTINEL LYMPH NODES Tissue Breast SURGICAL PATHOLOGY Clemente Bermudez MD 5/14/2025 1108    B : RIGHT BREAST TISSUE Tissue Breast SURGICAL PATHOLOGY Clemente Bermudez MD 5/14/2025 1111    C : LEFT BREAST TISSUE Tissue Breast SURGICAL PATHOLOGY Clemente Bermudez MD 5/14/2025 1156        Implants:  Implant Name Type Inv. Item Serial No.  Lot No. LRB No. Used Action   MESH SURG SZ 20 X 25 IN POLYDIOXANONE MACROPOROUS MONOFILAME - [id number]  MESH SURG SZ 20 X 25 IN POLYDIOXANONE MACROPOROUS MONOFILAME  [employer] TYRONE-WD [id number] Right 1 Implanted   MESH SURG SZ 20 X 25 IN POLYDIOXANONE MACROPOROUS MONOFILAME - [id number]  MESH SURG SZ 20 X 25 IN POLYDIOXANONE MACROPOROUS MONOFILAME  [employer] Den[employer]CI[employer] TYRONE-WD [id number]

## 2025-05-14 NOTE — OP NOTE
Operative Note      Patient: Negra Coburn  YOB: 1960  MRN: [id number]    Date of Procedure: 5/14/2025    Pre-Op Diagnosis Codes:      * Malignant neoplasm of lower-inner quadrant of right breast, estrogen receptor positive (HCC) [C50.311, Z17.0]    Post-Op Diagnosis: Same       Procedure(s):  BILATERAL BREAST MASTECTOMY, RIGHT SENTINEL LYMPH NODE (@8AM) BIOPSY AND PEC BLOCK  BILATERAL BREAST MASTECTOMY RECONSTRUCTION WITH TISSUE EXPANDER PLACEMENT,  IMPLANTATION OF BIOLOGIC MESH    Surgeon(s):  Clemente Bermudez MD Kocak, Ergun, MD Kocak, Ergun, MD    Assistant:   Surgical Assistant: Manju Linares Assistant: Janis Mo    Anesthesia: General    Estimated Blood Loss (mL): less than 100     Complications: None    Specimens:   ID Type Source Tests Collected by Time Destination   A : RIGHT BREAST SENTINEL LYMPH NODES Tissue Breast SURGICAL PATHOLOGY Clemente Bermudez MD 5/14/2025 1108    B : RIGHT BREAST TISSUE Tissue Breast SURGICAL PATHOLOGY Clemente Bermudez MD 5/14/2025 1111    C : LEFT BREAST TISSUE Tissue Breast SURGICAL PATHOLOGY Clemente Bermudez MD 5/14/2025 1156        Implants:  Implant Name Type Inv. Item Serial No.  Lot No. LRB No. Used Action   MESH SURG SZ 20 X 25 IN POLYDIOXANONE MACROPOROUS MONOFILAME - [id number]  MESH SURG SZ 20 X 25 IN POLYDIOXANONE MACROPOROUS MONOFILAME  [employer] TYRONE-WD [id number] Right 1 Implanted   MESH SURG SZ 20 X 25 IN POLYDIOXANONE MACROPOROUS MONOFILAME - [id number]  MESH SURG SZ 20 X 25 IN POLYDIOXANONE MACROPOROUS MONOFILAME  [employer] TYRONE-WD [id number] Left 1 Implanted         Drains:   Closed/Suction Drain Left Breast Bulb (Active)       Closed/Suction Drain Right Breast Bulb (Active)       Urinary Catheter 05/14/25 2 Way (Active)       Findings:  Infection Present At Time Of Surgery (PATOS) (choose all levels that have infection present):  No infection present  Other Findings: at least 4 lymph nodes removed  [place] Node Biopsy

## 2025-05-14 NOTE — PLAN OF CARE
Problem: Discharge Planning  Goal: Discharge to home or other facility with appropriate resources  Outcome: Progressing  Flowsheets  Taken 5/14/2025 1611  Discharge to home or other facility with appropriate resources:   Identify barriers to discharge with patient and caregiver   Identify discharge learning needs (meds, wound care, etc)   Arrange for needed discharge resources and transportation as appropriate  Taken 5/14/2025 1600  Discharge to home or other facility with appropriate resources:   Identify barriers to discharge with patient and caregiver   Arrange for needed discharge resources and transportation as appropriate   Identify discharge learning needs (meds, wound care, etc)     Problem: Pain  Goal: Verbalizes/displays adequate comfort level or baseline comfort level  Outcome: Progressing     Problem: ABCDS Injury Assessment  Goal: Absence of physical injury  Outcome: Progressing     Problem: Safety - Adult  Goal: Free from fall injury  Outcome: Progressing

## 2025-05-14 NOTE — H&P
History and Physical Service   [facility]    HISTORY AND PHYSICAL EXAMINATION            Date of Evaluation: 5/14/2025  Patient name:  Negra Coburn  MRN:   [id number]  YOB: 1960  PCP:    Robert Hall MD    History Obtained From:     Patient, medical records    History of Present Illness:     This is Negra Coburn a 65 y.o. female who presents today for a BILATERAL BREAST MASTECTOMY, RIGHT SENTINEL LYMPH NODE (@8AM) BIOPSY AND PEC BLOCK by Clemente Bermudez MD BILATERAL BREAST MASTECTOMY RECONSTRUCTION WITH TISSUE EXPANDER PLACEMENT, POSSIBLE IMPLANTATION OF BIOLOGICAL MESH - BILATERAL by Kevin Sanches MD for Malignant neoplasm of lower-inner quadrant of right breast, estrogen recep*. Patient reports she found a mass in her right breast in December 2024. She had a diagnostic mammogram completed 01/28/25 the demonstrated a 13 x 16 x 25 mm mass in the right upper breast. She then had biopsy that revealed invasive lobular carcinoma, ER/HI positive HER2/henry negative. Diagnostic imaging results listed below. She is established with oncology Dr. Purdy and is currently being treated with letrozole. Patient denies any breast pain, skin changes, drainage, or nipple inversion. Denies fever, chills, shortness of breath, cough, congestion, wheezing, chest pain, open sores or wounds. Denies hx of diabetes. Denies any current blood thinning medications.     Past Medical History:     Past Medical History:   Diagnosis Date    Anemia     low iron-noted per patient when she went to donate plasma    Cancer (HCC)     right breast    Former smoker     GERD (gastroesophageal reflux disease)     HTN (hypertension)     Macular degeneration     beginning per patient    Pneumonia     Sleep apnea 8/2022    does not wear cpap religiously    Wears glasses         Past Surgical History:     Past Surgical History:   Procedure Laterality Date    BUNIONECTOMY Right 2015    COLONOSCOPY  03/19/2024    Dr Jas RODRIGUEZ

## 2025-05-14 NOTE — ANESTHESIA PROCEDURE NOTES
Peripheral Block    Patient location during procedure: OR  Reason for block: procedure for pain, post-op pain management and at surgeon's request  Start time: 5/14/2025 10:06 AM  End time: 5/14/2025 10:16 AM  Staffing  Performed: anesthesiologist   Anesthesiologist: Khushboo Gill MD  Performed by: Khushboo Gill MD  Authorized by: Khushboo Gill MD    Preanesthetic Checklist  Completed: patient identified, IV checked, site marked, risks and benefits discussed, surgical/procedural consents, equipment checked, pre-op evaluation, timeout performed, anesthesia consent given, oxygen available, monitors applied/VS acknowledged, fire risk safety assessment completed and verbalized and blood product R/B/A discussed and consented  Peripheral Block   Patient position: supine  Prep: ChloraPrep  Provider prep: mask and sterile gloves  Patient monitoring: cardiac monitor, continuous pulse ox, continuous capnometry, frequent blood pressure checks, IV access, oxygen and responsive to questions  Block type: PECS II and PECS I  Laterality: bilateral  Injection technique: single-shot  Guidance: ultrasound guided  Local infiltration: lidocaine (8mg decadron)  Infiltration strength: 1 %  Local infiltration: lidocaine (8mg decadron)  Dose: 3 mL    Needle   Needle type: pencil-tip   Needle gauge: 20 G  Needle localization: ultrasound guidance  Assessment   Injection assessment: negative aspiration for heme, no paresthesia on injection, local visualized surrounding nerve on ultrasound and no intravascular symptoms  Paresthesia pain: none  Slow fractionated injection: yes  Hemodynamics: stable  Outcomes: patient tolerated procedure well and uncomplicated    Additional Notes  Ultrasound guided, needle tip visualized throughout with ultrasound, intraop  Medications Administered  BUPivacaine (MARCAINE) PF injection 0.25% - Perineural   60 mL - 5/14/2025 10:06:00 AM

## 2025-05-14 NOTE — PROGRESS NOTES
Pt admitted to room #2005 from PACU.  Oriented to room and call light/tv controls.  Bed in lowest position, wheels locked, 2/4 side rails up  Call light in reach, room free of clutter, adequate lighting provided.

## 2025-05-14 NOTE — ANESTHESIA PRE PROCEDURE
Department of Anesthesiology  Preprocedure Note       Name:  Negra Coburn   Age:  65 y.o.  :  1960                                          MRN:  [id number]         Date:  2025      Surgeon: Surgeon(s):  Clemente Bermudez MD Kocak, Ergun, MD    Procedure: Procedure(s):  BILATERAL BREAST MASTECTOMY, RIGHT SENTINEL LYMPH NODE (@8AM) BIOPSY AND PEC BLOCK  BILATERAL BREAST MASTECTOMY RECONSTRUCTION WITH TISSUE EXPANDER PLACEMENT, POSSIBLE IMPLANTATION OF BIOLOGIC MESH    Medications prior to admission:   Prior to Admission medications    Medication Sig Start Date End Date Taking? Authorizing Provider   lisinopril (PRINIVIL;ZESTRIL) 40 MG tablet Take 0.5 tablets by mouth daily 25  Yes Robert Hall MD   amLODIPine (NORVASC) 5 MG tablet Take 1 tablet by mouth 2 times daily 25  Yes Robert Hall MD   furosemide (LASIX) 20 MG tablet Take 1 tablet by mouth daily 25  Yes Robert Hall MD   vitamin C (ASCORBIC ACID) 500 MG tablet Take 1 tablet by mouth daily   Yes Chavo Lr MD   vitamin B-12 (CYANOCOBALAMIN) 1000 MCG tablet Take 1 tablet by mouth daily   Yes Chavo Lr MD   omeprazole (PRILOSEC) 20 MG delayed release capsule TAKE 1 CAPSULE BY MOUTH EVERY DAY 30 MINUTES BEFORE BREAKFAST 25  Yes Robert Hall MD   letrozole (FEMARA) 2.5 MG tablet Take 1 tablet by mouth daily 3/18/25  Yes Magda Purdy MD   celecoxib (CELEBREX) 200 MG capsule Take 1 capsule by mouth daily 24  Yes Robert Hall MD   diphenhydrAMINE (BENADRYL) 25 MG capsule Take 2 capsules by mouth nightly as needed for Itching   Yes Chavo Lr MD   ferrous sulfate (IRON 325) 325 (65 Fe) MG tablet Take 1 tablet by mouth daily (with breakfast)   Yes Chavo Lr MD   diclofenac sodium (VOLTAREN) 1 % GEL Apply 2 g topically 4 times daily as needed 7/10/23  Yes Chavo Lr MD   Cholecalciferol (VITAMIN D3) 50 MCG ( UT) CAPS Take 1 capsule by mouth daily   Yes

## 2025-05-14 NOTE — ANESTHESIA POSTPROCEDURE EVALUATION
Department of Anesthesiology  Postprocedure Note    Patient: Negra Coburn  MRN: [id number]  YOB: 1960  Date of evaluation: 5/14/2025    Procedure Summary       Date: 05/14/25 Room / Location: [address]    Anesthesia Start: 0941 Anesthesia Stop: 1344    Procedures:       BILATERAL BREAST MASTECTOMY, RIGHT SENTINEL LYMPH NODE (@8AM) BIOPSY AND PEC BLOCK (Bilateral: Breast)      BILATERAL BREAST MASTECTOMY RECONSTRUCTION WITH TISSUE EXPANDER PLACEMENT,  IMPLANTATION OF BIOLOGIC MESH (Bilateral: Breast) Diagnosis:       Malignant neoplasm of lower-inner quadrant of right breast, estrogen receptor positive (HCC)      (Malignant neoplasm of lower-inner quadrant of right breast, estrogen receptor positive (HCC) [C50.311, Z17.0])    Surgeons: Clemente Bermudez MD; Kevin Sanches MD Responsible Provider: Khushboo Gill MD    Anesthesia Type: general ASA Status: 2            Anesthesia Type: No value filed.    Lian Phase I: Lian Score: 8    Lian Phase II:      Anesthesia Post Evaluation    Patient location during evaluation: PACU  Patient participation: complete - patient participated  Level of consciousness: awake  Airway patency: patent  Nausea & Vomiting: no nausea  Cardiovascular status: blood pressure returned to baseline  Respiratory status: acceptable  Hydration status: euvolemic  Comments: Multimodal analgesia pain management as indicated by procedure  Multimodal analgesia pain management approach  Pain management: adequate    No notable events documented.

## 2025-05-15 VITALS
HEART RATE: 81 BPM | SYSTOLIC BLOOD PRESSURE: 124 MMHG | WEIGHT: 202 LBS | BODY MASS INDEX: 31.71 KG/M2 | DIASTOLIC BLOOD PRESSURE: 58 MMHG | TEMPERATURE: 97.9 F | OXYGEN SATURATION: 95 % | HEIGHT: 67 IN | RESPIRATION RATE: 16 BRPM

## 2025-05-15 PROCEDURE — 6370000000 HC RX 637 (ALT 250 FOR IP): Performed by: SURGERY

## 2025-05-15 PROCEDURE — 97530 THERAPEUTIC ACTIVITIES: CPT

## 2025-05-15 PROCEDURE — 97116 GAIT TRAINING THERAPY: CPT

## 2025-05-15 PROCEDURE — 97162 PT EVAL MOD COMPLEX 30 MIN: CPT

## 2025-05-15 PROCEDURE — 6360000002 HC RX W HCPCS: Performed by: SURGERY

## 2025-05-15 PROCEDURE — 2500000003 HC RX 250 WO HCPCS: Performed by: SURGERY

## 2025-05-15 RX ADMIN — PANTOPRAZOLE SODIUM 40 MG: 40 TABLET, DELAYED RELEASE ORAL at 05:12

## 2025-05-15 RX ADMIN — DEXTROSE MONOHYDRATE, SODIUM CHLORIDE, AND POTASSIUM CHLORIDE: 50; 4.5; 1.49 INJECTION, SOLUTION INTRAVENOUS at 01:44

## 2025-05-15 RX ADMIN — SODIUM CHLORIDE, PRESERVATIVE FREE 10 ML: 5 INJECTION INTRAVENOUS at 08:09

## 2025-05-15 RX ADMIN — LISINOPRIL 20 MG: 20 TABLET ORAL at 08:09

## 2025-05-15 RX ADMIN — AMLODIPINE BESYLATE 5 MG: 5 TABLET ORAL at 08:09

## 2025-05-15 RX ADMIN — HYDROCODONE BITARTRATE AND ACETAMINOPHEN 2 TABLET: 5; 325 TABLET ORAL at 08:54

## 2025-05-15 RX ADMIN — HYDROCODONE BITARTRATE AND ACETAMINOPHEN 2 TABLET: 5; 325 TABLET ORAL at 05:12

## 2025-05-15 RX ADMIN — Medication 2000 MG: at 00:05

## 2025-05-15 RX ADMIN — FUROSEMIDE 20 MG: 20 TABLET ORAL at 08:09

## 2025-05-15 RX ADMIN — BACLOFEN 10 MG: 10 TABLET ORAL at 09:48

## 2025-05-15 RX ADMIN — BISACODYL 5 MG: 5 TABLET, COATED ORAL at 08:09

## 2025-05-15 ASSESSMENT — PAIN DESCRIPTION - DESCRIPTORS
DESCRIPTORS: ACHING;SHARP
DESCRIPTORS: SPASM;ACHING
DESCRIPTORS: ACHING

## 2025-05-15 ASSESSMENT — PAIN DESCRIPTION - FREQUENCY
FREQUENCY: CONTINUOUS

## 2025-05-15 ASSESSMENT — PAIN - FUNCTIONAL ASSESSMENT
PAIN_FUNCTIONAL_ASSESSMENT: PREVENTS OR INTERFERES SOME ACTIVE ACTIVITIES AND ADLS

## 2025-05-15 ASSESSMENT — PAIN DESCRIPTION - LOCATION
LOCATION: BREAST;CHEST
LOCATION: BREAST
LOCATION: BREAST;CHEST

## 2025-05-15 ASSESSMENT — PAIN DESCRIPTION - ONSET
ONSET: ON-GOING

## 2025-05-15 ASSESSMENT — PAIN DESCRIPTION - PAIN TYPE
TYPE: SURGICAL PAIN

## 2025-05-15 ASSESSMENT — PAIN DESCRIPTION - ORIENTATION
ORIENTATION: RIGHT;LEFT
ORIENTATION: RIGHT;LEFT
ORIENTATION: RIGHT;LEFT;ANTERIOR

## 2025-05-15 ASSESSMENT — PAIN SCALES - GENERAL
PAINLEVEL_OUTOF10: 7
PAINLEVEL_OUTOF10: 6
PAINLEVEL_OUTOF10: 3
PAINLEVEL_OUTOF10: 9
PAINLEVEL_OUTOF10: 7

## 2025-05-15 NOTE — PLAN OF CARE
Problem: Pain  Goal: Verbalizes/displays adequate comfort level or baseline comfort level  5/15/2025 0319 by Jennifer Macedo, RN  Outcome: Progressing  Flowsheets (Taken 5/15/2025 0319)  Verbalizes/displays adequate comfort level or baseline comfort level:   Encourage patient to monitor pain and request assistance   Administer analgesics based on type and severity of pain and evaluate response   Assess pain using appropriate pain scale   Implement non-pharmacological measures as appropriate and evaluate response     Problem: Safety - Adult  Goal: Free from fall injury  5/15/2025 0319 by Jennifer Macedo, RN  Outcome: Progressing  Flowsheets (Taken 5/15/2025 0319)  Free From Fall Injury: Instruct family/caregiver on patient safety

## 2025-05-15 NOTE — CARE COORDINATION
Case Management Assessment  Initial Evaluation    Date/Time of Evaluation: 5/15/2025 12:48 PM  Assessment Completed by: RIGOBERTO Schmidt    If patient is discharged prior to next notation, then this note serves as note for discharge by case management.    Patient Name: Negra Coburn                   YOB: 1960  Diagnosis: Malignant neoplasm of lower-inner quadrant of right breast, estrogen receptor positive (HCC) [C50.311, Z17.0]  Malignant neoplasm of upper-outer quadrant of right breast in female, estrogen receptor positive (HCC) [C50.411, Z17.0]                   Date / Time: 5/14/2025  6:30 AM    Patient Admission Status: Outpatient in a bed   Readmission Risk (Low < 19, Mod (19-27), High > 27): No data recorded  Current PCP: Robert Hall MD  PCP verified by ? Yes    Chart Reviewed: Yes      History Provided by: Patient  Patient Orientation: Alert and Oriented    Patient Cognition: Alert    Hospitalization in the last 30 days (Readmission):  No    If yes, Readmission Assessment in  Navigator will be completed.    Advance Directives:      Code Status: Full Code   Patient's Primary Decision Maker is: Legal Next of Kin      Discharge Planning:    Patient lives with: Spouse/Significant Other Type of Home: House  Primary Care Giver: Self  Patient Support Systems include: Spouse/Significant Other, Children   Current Financial resources: None  Current community resources: None  Current services prior to admission: None            Current DME:              Type of Home Care services:  None    ADLS  Prior functional level: Independent in ADLs/IADLs  Current functional level: Independent in ADLs/IADLs    PT AM-PAC: 22 /24  OT AM-PAC:   /24    Family can provide assistance at DC: Yes  Would you like Case Management to discuss the discharge plan with any other family members/significant others, and if so, who? No  Plans to Return to Present Housing: Yes  Other Identified Issues/Barriers to

## 2025-05-15 NOTE — PROGRESS NOTES
Physical Therapy  Facility/Department: [facility] SURG  Physical Therapy Initial Assessment    Name: Negra Coburn  : 1960  MRN: [id number]  Date of Service: 5/15/2025    Discharge Recommendations:         Procedure Summary         Date: 25 Room / Location: [address]     Anesthesia Start: 941 Anesthesia Stop: 1344     Procedures:       BILATERAL BREAST MASTECTOMY, RIGHT SENTINEL LYMPH NODE (@8AM) BIOPSY AND PEC BLOCK (Bilateral: Breast)      BILATERAL BREAST MASTECTOMY RECONSTRUCTION WITH TISSUE EXPANDER PLACEMENT,  IMPLANTATION OF BIOLOGIC MESH (Bilateral: Breast) Diagnosis:       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 A diagnosis of Malignant neoplasm of lower-inner quadrant of right breast, estrogen receptor positive (HCC) was also pertinent to this visit.  Past Medical History:  has a past medical history of Anemia, Cancer (HCC), Former smoker, GERD (gastroesophageal reflux disease), HTN (hypertension), Macular degeneration, Pneumonia, Sleep apnea, and Wears glasses.  Past Surgical History:  has a past surgical history that includes Tonsillectomy; Foot fracture surgery (Left, ); Bunionectomy (Right, ); US Breast Fine Needle Aspiration (Right, ); Colonoscopy (2024); US BREAST BIOPSY W LOC DEVICE 1ST LESION RIGHT (Right, 2025); Mastectomy (Bilateral, 2025); and Breast enhancement surgery (Bilateral, 2025).    Assessment  Assessment: Pt. doing well POD1. Good gait/

## 2025-05-15 NOTE — DISCHARGE INSTRUCTIONS
Regarding the wound: please check with Dr. Davis's office for exact wound care instructions.  1 ) keep the dressing on and dry until you get further instruction from Dr. Davis.   2 ) Wear a supportive bra nonstop until you receive further instructions.  3 ) No showering until cleared by Dr. Davsi.    Regarding the drain:  1)  empty the drain once or twice a day and record the amount that comes out in milliliters (ml) or cc (cubic centimeters).  The amount for 24 hours is needed daily.  2)  To empty the drain, hold the bulb upright. Remove the little cap. Tip the bulb over the container to record the output and squeeze out the contents.  While it is still squeezed, turn it upright and put the cap back on.  The drain works by the bulb being squeezed.   3)  Place a clean dry gauze over the drain as it exits the skin.  4)  You can place the drain bulb in your pocket of your clothing or pin it to your clothing. Again, you don't want it to hang.  5)  Your drainage will be a dark brown/black initially. It will eventually turn more red and then watery red.     Regarding pain management:  1 ) You have oxycodone ordered for you. Please take one tonight and one tomorrow to keep your pain from flaring up if you like. After that, take it as needed for pain not managed by the scheduled tylenol.  2 ) I recommend you can take tylenol (2 regular or 1 extra strength) every 6 hours with food for the first 2-3 days to keep your pain level down.    3 ) You can apply ice/cold compresses for 20-30 minutes at a time as often as you like to help with pain.  4 ) Please take a gentle laxative such as dulcolax or sennakot if you are taking the oxycodone to reduce the risk of constipation.    Regarding activity:  1 ) take it easy and limit activity with your arm on the surgical side. Specifically, avoid pushing, pulling, or lifting anything greater than 10 pounds (about the weight of a gallon of milk) or any repetitive motions like vacuuming,

## 2025-05-15 NOTE — PLAN OF CARE
Problem: Discharge Planning  Goal: Discharge to home or other facility with appropriate resources  Outcome: Progressing  Flowsheets (Taken 5/15/2025 0820)  Discharge to home or other facility with appropriate resources:   Identify barriers to discharge with patient and caregiver   Arrange for needed discharge resources and transportation as appropriate   Identify discharge learning needs (meds, wound care, etc)   Refer to discharge planning if patient needs post-hospital services based on physician order or complex needs related to functional status, cognitive ability or social support system     Problem: Pain  Goal: Verbalizes/displays adequate comfort level or baseline comfort level  5/15/2025 1321 by Gloria Noguera, RN  Outcome: Progressing  5/15/2025 0319 by Jennifer Macedo, RN  Outcome: Progressing  Flowsheets (Taken 5/15/2025 0319)  Verbalizes/displays adequate comfort level or baseline comfort level:   Encourage patient to monitor pain and request assistance   Administer analgesics based on type and severity of pain and evaluate response   Assess pain using appropriate pain scale   Implement non-pharmacological measures as appropriate and evaluate response     Problem: ABCDS Injury Assessment  Goal: Absence of physical injury  Outcome: Progressing     Problem: Safety - Adult  Goal: Free from fall injury  5/15/2025 1321 by Gloria Noguera, RN  Outcome: Progressing  5/15/2025 0319 by Jennifer Macedo, RN  Outcome: Progressing  Flowsheets (Taken 5/15/2025 0319)  Free From Fall Injury: Instruct family/caregiver on patient safety

## 2025-05-15 NOTE — PROGRESS NOTES
Patient discharged via wheelchair to home with all her belongings in stable condition. Meds to bed were picked up by patient's  yesterday. Patient and patient's  educated on how to empty and care for the YANDEL drain. Patient and patient's  understood and acknowledged and did return demo of the instructions provided. They also understood and acknowledged AVS.

## 2025-05-19 LAB — SURGICAL PATHOLOGY REPORT: NORMAL

## 2025-05-20 ENCOUNTER — TELEPHONE (OUTPATIENT)
Age: 65
End: 2025-05-20

## 2025-06-02 ENCOUNTER — OFFICE VISIT (OUTPATIENT)
Dept: SURGERY | Age: 65
End: 2025-06-02

## 2025-06-02 VITALS
SYSTOLIC BLOOD PRESSURE: 144 MMHG | DIASTOLIC BLOOD PRESSURE: 70 MMHG | HEIGHT: 67 IN | WEIGHT: 200 LBS | HEART RATE: 81 BPM | BODY MASS INDEX: 31.39 KG/M2

## 2025-06-02 DIAGNOSIS — Z17.0 MALIGNANT NEOPLASM OF UPPER-OUTER QUADRANT OF RIGHT BREAST IN FEMALE, ESTROGEN RECEPTOR POSITIVE (HCC): Primary | ICD-10-CM

## 2025-06-02 DIAGNOSIS — C50.411 MALIGNANT NEOPLASM OF UPPER-OUTER QUADRANT OF RIGHT BREAST IN FEMALE, ESTROGEN RECEPTOR POSITIVE (HCC): Primary | ICD-10-CM

## 2025-06-02 PROCEDURE — 99024 POSTOP FOLLOW-UP VISIT: CPT | Performed by: SURGERY

## 2025-06-02 RX ORDER — CEPHALEXIN 500 MG/1
500 CAPSULE ORAL 3 TIMES DAILY
COMMUNITY
Start: 2025-05-27

## 2025-06-02 ASSESSMENT — ENCOUNTER SYMPTOMS
BLOOD IN STOOL: 0
RECTAL PAIN: 0
ABDOMINAL DISTENTION: 0
SHORTNESS OF BREATH: 0
BACK PAIN: 1
CONSTIPATION: 0
APNEA: 0
CHEST TIGHTNESS: 0
ABDOMINAL PAIN: 0
WHEEZING: 0
COLOR CHANGE: 0
COUGH: 0
DIARRHEA: 0
STRIDOR: 0
NAUSEA: 0
VOMITING: 0
ANAL BLEEDING: 0

## 2025-06-02 NOTE — PROGRESS NOTES
Patient's Name/Date of Birth: Negra Coburn / 1960 (65 y.o.)    Date: June 2, 2025    HPI: Pt is a 65 y.o. female who presents for a postoperative check following Bilateral Breast Mastectomy, Right [place] Lymph Node (@8am) Biopsy And Pec Block - Bilateral and Bilateral Breast Mastectomy Reconstruction With Tissue Expander Placement,  Implantation Of Biologic Mesh - Bilateral on 5/14/2025.    She has been following up with Dr. Sanches who is managing her drains and her wounds.    Her pathology from her surgery is below.  The full pathology report is at the conclusion of the report.  Final Diagnosis  A.  Right axillary sentinel lymph nodes, excision:    - Negative for metastatic carcinoma (0/4), a single sentinel lymph  node shows isolated tumor cells only.    B.  Right breast, mastectomy:    -Invasive lobular carcinoma, grade 1.  Status post neoadjuvant aromatase inhibitor.    -Carcinoma is 6.2cm.    -Previously determined to be estrogen receptor positive, progesterone  receptor positive, and HER2 negative IHC (score 1+).    - Margins are free of carcinoma, see microscopic checklist for  discussion    C.  Left breast, mastectomy:    - Fibrocystic change with fibrosis, cysts, apocrine metaplasia, and  usual ductal hyperplasia.    - Negative for atypia and malignancy.        Physical Exam:  Vitals:    06/02/25 1356   BP: (!) 144/70   Pulse: 81     Physical Exam  Chest:      Comments: The bilateral chest walls are healing well good color. No erythema, drainage, or fluctuance.              Assessment/Plan:  1. Malignant neoplasm of upper-outer quadrant of right breast in female, estrogen receptor positive (HCC)    Staging: T3, N0 invasive lobular grade 1      Negra Coburn is a 65 y.o. female that is seen today for a postoperative check following Bilateral Breast Mastectomy, Right [place] Lymph Node (@8am) Biopsy And Pec Block - Bilateral and Bilateral Breast Mastectomy Reconstruction With Tissue Expander

## 2025-06-02 NOTE — PROGRESS NOTES
Review of Systems   Constitutional:  Negative for activity change, appetite change, chills, diaphoresis, fatigue, fever and unexpected weight change.   Respiratory:  Negative for apnea, cough, chest tightness, shortness of breath, wheezing and stridor.    Cardiovascular:  Negative for chest pain and leg swelling.   Gastrointestinal:  Negative for abdominal distention, abdominal pain, anal bleeding, blood in stool, constipation, diarrhea, nausea, rectal pain and vomiting.   Genitourinary:  Negative for difficulty urinating, dysuria, enuresis, flank pain, frequency, hematuria and urgency.   Musculoskeletal:  Positive for back pain.   Skin:  Negative for color change and pallor.   Allergic/Immunologic: Negative for food allergies and immunocompromised state.   Neurological:  Negative for syncope, speech difficulty, weakness, light-headedness, numbness and headaches.   Hematological:  Negative for adenopathy. Does not bruise/bleed easily.   Psychiatric/Behavioral:  The patient is not nervous/anxious.    All other systems reviewed and are negative.

## 2025-06-05 ENCOUNTER — TELEPHONE (OUTPATIENT)
Age: 65
End: 2025-06-05

## 2025-06-05 ENCOUNTER — HOSPITAL ENCOUNTER (OUTPATIENT)
Dept: RADIATION ONCOLOGY | Age: 65
Discharge: HOME | End: 2025-06-05
Payer: COMMERCIAL

## 2025-06-05 ENCOUNTER — OFFICE VISIT (OUTPATIENT)
Age: 65
End: 2025-06-05

## 2025-06-05 VITALS
OXYGEN SATURATION: 100 % | DIASTOLIC BLOOD PRESSURE: 67 MMHG | HEART RATE: 83 BPM | TEMPERATURE: 97.8 F | WEIGHT: 202 LBS | BODY MASS INDEX: 31.64 KG/M2 | SYSTOLIC BLOOD PRESSURE: 151 MMHG

## 2025-06-05 VITALS
SYSTOLIC BLOOD PRESSURE: 151 MMHG | RESPIRATION RATE: 16 BRPM | DIASTOLIC BLOOD PRESSURE: 67 MMHG | WEIGHT: 202 LBS | OXYGEN SATURATION: 100 % | TEMPERATURE: 97.8 F | BODY MASS INDEX: 31.64 KG/M2 | HEART RATE: 83 BPM

## 2025-06-05 DIAGNOSIS — Z15.89 CHEK2 GENE MUTATION POSITIVE: ICD-10-CM

## 2025-06-05 DIAGNOSIS — C50.311 MALIGNANT NEOPLASM OF LOWER-INNER QUADRANT OF RIGHT BREAST OF FEMALE, ESTROGEN RECEPTOR POSITIVE (HCC): Primary | ICD-10-CM

## 2025-06-05 DIAGNOSIS — Z17.0 MALIGNANT NEOPLASM OF LOWER-INNER QUADRANT OF RIGHT BREAST OF FEMALE, ESTROGEN RECEPTOR POSITIVE (HCC): Primary | ICD-10-CM

## 2025-06-05 PROCEDURE — 99212 OFFICE O/P EST SF 10 MIN: CPT | Performed by: RADIOLOGY

## 2025-06-05 RX ORDER — LETROZOLE 2.5 MG/1
2.5 TABLET, FILM COATED ORAL DAILY
Qty: 30 TABLET | Refills: 5 | Status: SHIPPED | OUTPATIENT
Start: 2025-06-05

## 2025-06-05 NOTE — PROGRESS NOTES
Negra Coburn  6/5/2025  9:08 AM      Vitals:    06/05/25 0845   BP: (!) 151/67   Pulse: 83   Resp: 16   Temp: 97.8 °F (36.6 °C)   SpO2: 100%    :                Wt Readings from Last 1 Encounters:   06/05/25 91.6 kg (202 lb)                Current Outpatient Medications:     letrozole (FEMARA) 2.5 MG tablet, Take 1 tablet by mouth daily, Disp: 30 tablet, Rfl: 5    cephALEXin (KEFLEX) 500 MG capsule, Take 1 capsule by mouth 3 times daily, Disp: , Rfl:     baclofen (LIORESAL) 10 MG tablet, Take 1 tablet by mouth 3 times daily as needed (muscle spasms), Disp: 21 tablet, Rfl: 0    ondansetron (ZOFRAN-ODT) 4 MG disintegrating tablet, Take 1 tablet by mouth 3 times daily as needed for Nausea or Vomiting, Disp: 21 tablet, Rfl: 0    lisinopril (PRINIVIL;ZESTRIL) 40 MG tablet, Take 0.5 tablets by mouth daily, Disp: 90 tablet, Rfl: 3    amLODIPine (NORVASC) 5 MG tablet, Take 1 tablet by mouth 2 times daily, Disp: 180 tablet, Rfl: 3    furosemide (LASIX) 20 MG tablet, Take 1 tablet by mouth daily, Disp: 60 tablet, Rfl: 3    vitamin C (ASCORBIC ACID) 500 MG tablet, Take 1 tablet by mouth daily, Disp: , Rfl:     vitamin B-12 (CYANOCOBALAMIN) 1000 MCG tablet, Take 1 tablet by mouth daily, Disp: , Rfl:     omeprazole (PRILOSEC) 20 MG delayed release capsule, TAKE 1 CAPSULE BY MOUTH EVERY DAY 30 MINUTES BEFORE BREAKFAST, Disp: 30 capsule, Rfl: 5    celecoxib (CELEBREX) 200 MG capsule, Take 1 capsule by mouth daily, Disp: 90 capsule, Rfl: 3    diphenhydrAMINE (BENADRYL) 25 MG capsule, Take 2 capsules by mouth nightly as needed for Itching, Disp: , Rfl:     ferrous sulfate (IRON 325) 325 (65 Fe) MG tablet, Take 1 tablet by mouth daily (with breakfast), Disp: , Rfl:     Blood Pressure Monitoring (COMFORT TOUCH BP CUFF/MEDIUM) MISC, 1 each by Does not apply route three times a week, Disp: 1 each, Rfl: 0    diclofenac sodium (VOLTAREN) 1 % GEL, Apply 2 g topically 4 times daily as needed, Disp: , Rfl:     Cholecalciferol (VITAMIN D3) 
progesterone receptor positive, %    HER2*--  HER2 PROTEIN EXPRESSION (IMMUNOHISTOCHEMISTRY):     Previously  determined to be negative by IHC (score 1).  HER2 GENE AMPLIFICATION (INSITU HYBRIDIZATION):     Can be performed  on request  KI-67 PERCENTAGE OF POSITIVE NUCLEI: Can be performed on request     ASSESSMENT AND PLAN:  Negra Coburn is a 65 y.o. female with a diagnosis of invasive lobular carcinoma of the right breast status post neoadjuvant therapy followed by right-sided mastectomy completed on 5/14/2025, ypT3 pN0( i+) M0 ER/HI positive HER2/henry negative    Oncotype DX recurrence score of 9, no adjuvant chemotherapy was recommended by medical oncology.  Patient is decarecommended to have adjuvant endocrine therapy and she is in agreement.    Given the maximum size of the tumor is 6.8 cm, patient is a candidate for postmastectomy RT to the right chest wall and regional lymph nodes per NCCN guidelines.  Patient was in agreement with this recommendation.    Patient is currently undergoing filling of her expanders, we will await until her fillings are completed per plastic surgery before proceeding with CT simulation.    Patient will need a CT simulation for treatment planning purposes.  Her radiation therapy will start shortly thereafter and will be given daily Monday through Friday for approximately 5 weeks.    With regards to radiation to the right chest wall, I discussed the possible short-term side effects of skin irritation (causing redness, dryness, or peeling), swelling and tenderness of the right chest wall, tiredness, low blood counts (causing infection or bleeding) and hair loss in treated area.  Possible long-term side effects discussed included hype/hypo-pigmentation of the skin, scarring of the lung (causing shortness of breath and cough), swelling of the right arm i.e. lymphedema (causing pain), limited range of motion of the right shoulder, damage to the nerves (causing numbness,

## 2025-06-05 NOTE — TELEPHONE ENCOUNTER
Name: Negra Coburn  : 1960  MRN: [id number]    Oncology Navigation Follow-Up Note    Contact Type:  Medical Oncology    Notes: Met with pt prior to her MO appt. She feels like she is doing ok post op. Does report discomfort in breasts at times from her expanders. She is scheduled for a f/u with plastic surgery next week. Encouraged pt to call navigator as needed with any questions, concerns or barriers. Confirmed pt has navigator's contact information.         Electronically signed by Linda Carlos RN on 2025 at 9:56 AM

## 2025-06-05 NOTE — PROGRESS NOTES
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 female who is referred to us for recently diagnosed right-sided breast cancer.  She found a mass in her breast in December 2024.  She underwent diagnostic mammogram in January/2025 and that showed 13 x 16 x 25 mm mass in the upper part of her right breast.  Biopsy was done and showed invasive lobular carcinoma that was ER/FL positive HER2/henry negative.  She is sent to us for a consultation, she presents today accompanied by her family.  She is well-informed and verbalized excellent understanding of her current condition.      In 2014, she the patient felt a lump in her right axilla.  She underwent a biopsy of the area and that showed apocrine sweat glands but no evidence of malignancy or abnormality otherwise  The original plan was to proceed with surgical intervention however, the patient had family reason to delay her surgery until May.  Because of that she was sent back to us to consider starting on systemic therapy with aromatase inhibitors.  Meanwhile, genetic testing showed positive for CHEK2 mutation  Due to family reason.  We started her aromatase inhibitor and delayed her surgery until May.  Bilateral mastectomy and axillary sentinel lymph node sampling was done in May/2025.  Pathology

## 2025-06-10 NOTE — PROGRESS NOTES
[facility]  [address] [place] [address]  Phone: [phone]  Fax: [phone]      Date of Visit: 2025  Patient Name: Negra Coburn   Patient :  1960     ASSESSMENT/PLAN     1. Essential hypertension     Assessment & Plan  1. Post-bilateral mastectomy status.  - Currently experiencing discomfort from spacers placed on top of the muscle.  - Will have her first fill on Saturday.  - Pathology report was negative, but due to the tumor size (>6 cm), radiation therapy has been recommended.  - Currently taking letrozole and will continue for at least 5 years. Prefers against chemotherapy, which has been indicated to be <1% effective for her.    2. Hypertension.  - Blood pressure readings are within the normal range today at 122/57.  - Currently taking half of a 40 mg dose of Prinivil (lisinopril) and amlodipine twice a day.  - Prescription for a 20 mg dose of Prinivil will be provided to facilitate further reduction as necessary.  - Advised to discontinue lisinopril and maintain amlodipine if experiencing dehydration or inadequate food and fluid intake.    3. Health Maintenance.  - Advised to consider receiving the shingles and RSV vaccines.  - RSV vaccine is recommended for all individuals over the age of 60, similar to the influenza vaccine, and is administered annually.  - Last year, RSV accounted for a higher number of hospitalizations than influenza in individuals over the age of 70.    Follow-up  - Scheduled for a follow-up visit in 4 months.    Return in about 4 months (around 10/11/2025) for htn, influenza immunization.    - Questions/concerns answered. Patient verbalized and expressed understanding. Medications, laboratory testing, imaging, consultation, and follow up as documented in this encounter.       Please be aware portions of this note were completed using voice recognition software and unforeseen errors may have

## 2025-06-11 ENCOUNTER — OFFICE VISIT (OUTPATIENT)
Age: 65
End: 2025-06-11
Payer: COMMERCIAL

## 2025-06-11 VITALS
HEART RATE: 85 BPM | BODY MASS INDEX: 31.04 KG/M2 | TEMPERATURE: 99 F | DIASTOLIC BLOOD PRESSURE: 57 MMHG | RESPIRATION RATE: 12 BRPM | WEIGHT: 198.2 LBS | SYSTOLIC BLOOD PRESSURE: 122 MMHG

## 2025-06-11 DIAGNOSIS — I10 ESSENTIAL HYPERTENSION: Primary | ICD-10-CM

## 2025-06-11 PROCEDURE — G8427 DOCREV CUR MEDS BY ELIG CLIN: HCPCS | Performed by: FAMILY MEDICINE

## 2025-06-11 PROCEDURE — 99213 OFFICE O/P EST LOW 20 MIN: CPT | Performed by: FAMILY MEDICINE

## 2025-06-11 PROCEDURE — 3078F DIAST BP <80 MM HG: CPT | Performed by: FAMILY MEDICINE

## 2025-06-11 PROCEDURE — G8417 CALC BMI ABV UP PARAM F/U: HCPCS | Performed by: FAMILY MEDICINE

## 2025-06-11 PROCEDURE — 3074F SYST BP LT 130 MM HG: CPT | Performed by: FAMILY MEDICINE

## 2025-06-11 PROCEDURE — 1036F TOBACCO NON-USER: CPT | Performed by: FAMILY MEDICINE

## 2025-06-11 PROCEDURE — 3017F COLORECTAL CA SCREEN DOC REV: CPT | Performed by: FAMILY MEDICINE

## 2025-06-11 ASSESSMENT — PATIENT HEALTH QUESTIONNAIRE - PHQ9
1. LITTLE INTEREST OR PLEASURE IN DOING THINGS: NOT AT ALL
2. FEELING DOWN, DEPRESSED OR HOPELESS: NOT AT ALL
SUM OF ALL RESPONSES TO PHQ QUESTIONS 1-9: 0

## 2025-06-11 NOTE — PATIENT INSTRUCTIONS
Would like you to reconsider getting shingles as well as your RSV vaccination.  Even though you do not think you ever had chickenpox as a kid it is hard for me to believe that you did not get exposed you may have had a very slight case with just a couple of lesions that just passed through we are waiting for the big full-blown case that your siblings got.  RSV is another vaccine that we are recommending for all people over the age of 60.  It is similar to influenza and that it is an annual virus.  We are now recommending just 1 shot.  Last year for those over age 70 it accounted for a greater number of hospitalizations and influenza.    The blood pressure today is excellent you are currently taking one half of the 40 mg Prinivil the next time you get it filled.  Do not  the 40 mg pill but let me call in a prescription for 20 mg size.  Then we can continue to wean you down as indicated if needed breaking a 40 mg pill into quarters is not acceptable.

## 2025-06-13 ENCOUNTER — PATIENT MESSAGE (OUTPATIENT)
Age: 65
End: 2025-06-13

## 2025-06-13 DIAGNOSIS — I10 ESSENTIAL HYPERTENSION: ICD-10-CM

## 2025-06-13 RX ORDER — LISINOPRIL 20 MG/1
20 TABLET ORAL DAILY
Qty: 90 TABLET | Refills: 3 | Status: SHIPPED | OUTPATIENT
Start: 2025-06-13 | End: 2025-06-13 | Stop reason: SDUPTHER

## 2025-06-13 RX ORDER — LISINOPRIL 20 MG/1
20 TABLET ORAL DAILY
Qty: 90 TABLET | Refills: 3 | Status: SHIPPED | OUTPATIENT
Start: 2025-06-13

## 2025-06-24 ENCOUNTER — TELEPHONE (OUTPATIENT)
Dept: SURGERY | Age: 65
End: 2025-06-24

## 2025-06-24 NOTE — TELEPHONE ENCOUNTER
Patient called in asking for a return to work note after 07/01 with no restrictions. Please send the letter [email or web address].

## 2025-06-26 ENCOUNTER — HOSPITAL ENCOUNTER (OUTPATIENT)
Dept: RADIATION ONCOLOGY | Age: 65
Discharge: HOME | End: 2025-06-26
Payer: COMMERCIAL

## 2025-06-26 VITALS
OXYGEN SATURATION: 97 % | HEART RATE: 87 BPM | TEMPERATURE: 98.1 F | BODY MASS INDEX: 31.43 KG/M2 | WEIGHT: 200.7 LBS | RESPIRATION RATE: 16 BRPM | SYSTOLIC BLOOD PRESSURE: 115 MMHG | DIASTOLIC BLOOD PRESSURE: 63 MMHG

## 2025-06-26 DIAGNOSIS — Z17.0 MALIGNANT NEOPLASM OF LOWER-INNER QUADRANT OF RIGHT BREAST OF FEMALE, ESTROGEN RECEPTOR POSITIVE (HCC): Primary | ICD-10-CM

## 2025-06-26 DIAGNOSIS — C50.311 MALIGNANT NEOPLASM OF LOWER-INNER QUADRANT OF RIGHT BREAST OF FEMALE, ESTROGEN RECEPTOR POSITIVE (HCC): Primary | ICD-10-CM

## 2025-06-26 PROCEDURE — 77334 RADIATION TREATMENT AID(S): CPT | Performed by: RADIOLOGY

## 2025-06-26 PROCEDURE — 77290 THER RAD SIMULAJ FIELD CPLX: CPT | Performed by: RADIOLOGY

## 2025-06-26 PROCEDURE — 99212 OFFICE O/P EST SF 10 MIN: CPT | Performed by: RADIOLOGY

## 2025-06-26 RX ORDER — BETAMETHASONE VALERATE 1.2 MG/G
CREAM TOPICAL
Qty: 45 G | Refills: 2 | Status: SHIPPED | OUTPATIENT
Start: 2025-06-26

## 2025-06-26 NOTE — PROGRESS NOTES
Negra Coburn  6/26/2025  1:33 PM      Vitals:    06/26/25 1311   BP: 115/63   Pulse: 87   Resp: 16   Temp: 98.1 °F (36.7 °C)   SpO2: 97%    :     Pain Assessment: None - Denies Pain          Wt Readings from Last 1 Encounters:   06/26/25 91 kg (200 lb 11.2 oz)                Current Outpatient Medications:     lisinopril (PRINIVIL;ZESTRIL) 20 MG tablet, Take 1 tablet by mouth daily, Disp: 90 tablet, Rfl: 3    letrozole (FEMARA) 2.5 MG tablet, Take 1 tablet by mouth daily, Disp: 30 tablet, Rfl: 5    cephALEXin (KEFLEX) 500 MG capsule, Take 1 capsule by mouth 3 times daily (Patient not taking: Reported on 6/11/2025), Disp: , Rfl:     baclofen (LIORESAL) 10 MG tablet, Take 1 tablet by mouth 3 times daily as needed (muscle spasms) (Patient not taking: Reported on 6/11/2025), Disp: 21 tablet, Rfl: 0    ondansetron (ZOFRAN-ODT) 4 MG disintegrating tablet, Take 1 tablet by mouth 3 times daily as needed for Nausea or Vomiting, Disp: 21 tablet, Rfl: 0    amLODIPine (NORVASC) 5 MG tablet, Take 1 tablet by mouth 2 times daily, Disp: 180 tablet, Rfl: 3    furosemide (LASIX) 20 MG tablet, Take 1 tablet by mouth daily, Disp: 60 tablet, Rfl: 3    vitamin C (ASCORBIC ACID) 500 MG tablet, Take 1 tablet by mouth daily, Disp: , Rfl:     vitamin B-12 (CYANOCOBALAMIN) 1000 MCG tablet, Take 1 tablet by mouth daily, Disp: , Rfl:     omeprazole (PRILOSEC) 20 MG delayed release capsule, TAKE 1 CAPSULE BY MOUTH EVERY DAY 30 MINUTES BEFORE BREAKFAST, Disp: 30 capsule, Rfl: 5    celecoxib (CELEBREX) 200 MG capsule, Take 1 capsule by mouth daily, Disp: 90 capsule, Rfl: 3    diphenhydrAMINE (BENADRYL) 25 MG capsule, Take 2 capsules by mouth nightly as needed for Itching, Disp: , Rfl:     ferrous sulfate (IRON 325) 325 (65 Fe) MG tablet, Take 1 tablet by mouth daily (with breakfast), Disp: , Rfl:     Blood Pressure Monitoring (COMFORT TOUCH BP CUFF/MEDIUM) MISC, 1 each by Does not apply route three times a week, Disp: 1 each, Rfl: 0

## 2025-06-26 NOTE — PROGRESS NOTES
[facility]            Radiation Oncology          [address]          [place] [address]        O: [phone]        F: [phone]       [email or web address]           Date of Service: 2025     Location:  [facility] Radiation Oncology,   [address] [place], [place] [address]   [phone]       RADIATION ONCOLOGY FOLLOW UP NOTE    Patient ID:   Negra Coburn  : 1960   MRN: [id number]    DIAGNOSIS:  Invasive lobular carcinoma of the right breast ypT3 pN0( i+) M0 ER/DC positive HER2/henry negative     INTERVAL HISTORY:   Negra Coburn is a 65 y.o. female who had a mammogram which demonstrated an abnormality in the right breast at the 12 o'clock position measuring 2.5 cm in the greatest dimension with a BI-RADS of 4.  Patient underwent a biopsy of the right breast mass which was positive for invasive lobular carcinoma grade 1 ER/DC positive HER2/henry negative.  Patient had an MRI of the breast in 2025 which demonstrated the presence of 5.1 cm mass.  She was started on neoadjuvant endocrine therapy, she then proceeded with a bilateral mastectomies and reconstructions completed on 2025.     Pathology from the left breast mastectomy was negative for atypia and malignancy, it showed fibrocystic changes with fibrosis, cysts, apocrine metaplasia, and usual ductal hyperplasia.     Pathology from the right breast mastectomy and sentinel lymph node sampling showed invasive lobular carcinoma grade 1 measuring 6.2 cm in the greatest dimension, all margins were negative, 4 sentinel lymph nodes were evaluated and 1 was positive for isolated tumor cells, ER/DC positive and HER2/henry were negative.    Since her last visit to the radiation clinic, patient has completed filling of her expanders under the care of plastic surgery.  She presents today to initiate course of adjuvant RT.  She is accompanied by her .  She reports some discomfort in the right

## 2025-06-26 NOTE — PROGRESS NOTES
Radiation Treatment Site/Plan/Fractions:25 daily radiation treatments to right chest wall and SCF.      Concurrent Chemotherapy/Immunotherapy:No      Cardiac Device:No      Transportation Concerns:No      Nursing Referrals and Reasons:None      Contrast Given:no          Miscellaneous Information:Patient arrives ambulatory with steady gait.  She is accompanied by her spouse.  Radiation therapy plan of care, treatment process and site specific side effects reviewed.  Patient educated on completing skin care BID with moisturizer and Betamethasone cream once treatment starts.  Questions answered to her satisfaction and questions answered to her satisfaction.  Prescription for Betamethasone cream sent to patient pharmacy.  She was escorted to simulation room.

## 2025-06-26 NOTE — DISCHARGE INSTRUCTIONS
What to expect next!   Simulation Scan      Prior to your radiation you will need a simulation CT scan. This scan is where they will precisely identify the area on your body where you will receive radiation. Positioning is extremely important, and your body will be positioned carefully. You will be in the same position during every treatment, and you will need to remain still during the treatments. Your doctor may order a mold or a mask (for head and neck treatment only) to help reproduce your treatment set up. You will also receive tattoos during this appointment. These tattoos are very important. The therapists use these tattoos to line your body up on the treatment table. Information from the CT scan is used to precisely locate the treatment fields and create a \"map\" for the physician to design the treatment. The CT scanner is specially designed to work with the other equipment in the department and is not a replacement for other diagnostic scans you may have received.   After simulation, details from the procedure are forwarded to medical radiation dosimetrists and medical physicists. These professionals perform highly technical calculations that will be used to set up the treatment machine (linear accelerator). The dosimetrist and physicist work closely with your radiation oncologist to develop the treatment plan, a process that typically takes 7-10 business days. Once the planning is completed, a therapist will call you with a start date. During that call your appointment time will also be determined.                                   Head and Neck Mask      Body Mold                     Radiation Tattoo              Breast Side Effects  Fatigue  Hair loss  Skin changes  Tenderness  Swelling     Fatigue  How long it lasts  When you will first feel fatigue depends on a few factors, such as your age, health, how active you are, and how you felt before radiation therapy started.  Fatigue can last from 6 weeks to

## 2025-06-30 ENCOUNTER — TELEPHONE (OUTPATIENT)
Dept: RADIATION ONCOLOGY | Age: 65
End: 2025-06-30

## 2025-06-30 NOTE — TELEPHONE ENCOUNTER
Disability paperwork completed by Dr. Boykin and faxed to [facility] at [phone] with confirmation of receipt received.  Original document retained for patient and also scanned into chart.

## 2025-07-02 ENCOUNTER — HOSPITAL ENCOUNTER (OUTPATIENT)
Dept: RADIATION ONCOLOGY | Age: 65
Discharge: HOME | End: 2025-07-02

## 2025-07-03 ENCOUNTER — TELEPHONE (OUTPATIENT)
Age: 65
End: 2025-07-03

## 2025-07-03 NOTE — TELEPHONE ENCOUNTER
Name: Negra Coburn  : 1960  MRN: [id number]    Oncology Navigation Follow-Up Note      Notes: Pt completed planning for radiation. Start date pending.     She may contact ONN as needed for barriers/concerns       Electronically signed by Linda Carlos RN on 7/3/2025 at 8:19 AM

## 2025-07-10 ENCOUNTER — HOSPITAL ENCOUNTER (OUTPATIENT)
Dept: RADIATION ONCOLOGY | Age: 65
Discharge: HOME | End: 2025-07-10
Payer: COMMERCIAL

## 2025-07-10 PROCEDURE — 77412 RADIATION TX DELIVERY LVL 3: CPT | Performed by: RADIOLOGY

## 2025-07-10 PROCEDURE — 77280 THER RAD SIMULAJ FIELD SMPL: CPT | Performed by: RADIOLOGY

## 2025-07-11 ENCOUNTER — HOSPITAL ENCOUNTER (OUTPATIENT)
Dept: RADIATION ONCOLOGY | Age: 65
Discharge: HOME | End: 2025-07-11
Payer: COMMERCIAL

## 2025-07-11 ENCOUNTER — CLINICAL DOCUMENTATION (OUTPATIENT)
Age: 65
End: 2025-07-11

## 2025-07-11 PROCEDURE — 77412 RADIATION TX DELIVERY LVL 3: CPT | Performed by: RADIOLOGY

## 2025-07-11 PROCEDURE — 77387 GUIDANCE FOR RADJ TX DLVR: CPT | Performed by: RADIOLOGY

## 2025-07-11 NOTE — PROGRESS NOTES
[facility] Nutrition Screen:    PG-SGA screening form reviewed. Score = 2. RD referral/nutrition evaluation indicated for scores > 4.   Please consult oncology dietitian with any future nutrition concerns/needs.

## 2025-07-14 ENCOUNTER — HOSPITAL ENCOUNTER (OUTPATIENT)
Dept: RADIATION ONCOLOGY | Age: 65
End: 2025-07-14
Payer: COMMERCIAL

## 2025-07-15 ENCOUNTER — APPOINTMENT (OUTPATIENT)
Dept: RADIATION ONCOLOGY | Age: 65
End: 2025-07-15
Payer: COMMERCIAL

## 2025-07-16 ENCOUNTER — HOSPITAL ENCOUNTER (OUTPATIENT)
Dept: RADIATION ONCOLOGY | Age: 65
Discharge: HOME | End: 2025-07-16
Payer: COMMERCIAL

## 2025-07-16 VITALS
SYSTOLIC BLOOD PRESSURE: 160 MMHG | WEIGHT: 204 LBS | HEART RATE: 88 BPM | BODY MASS INDEX: 31.95 KG/M2 | DIASTOLIC BLOOD PRESSURE: 79 MMHG | RESPIRATION RATE: 16 BRPM | OXYGEN SATURATION: 98 %

## 2025-07-16 PROCEDURE — 77387 GUIDANCE FOR RADJ TX DLVR: CPT | Performed by: RADIOLOGY

## 2025-07-16 PROCEDURE — 77412 RADIATION TX DELIVERY LVL 3: CPT | Performed by: RADIOLOGY

## 2025-07-16 NOTE — PROGRESS NOTES
Negra Coburn  7/16/2025  Wt Readings from Last 3 Encounters:   07/16/25 92.5 kg (204 lb)   06/26/25 91 kg (200 lb 11.2 oz)   06/11/25 89.9 kg (198 lb 3.2 oz)     Body mass index is 31.95 kg/m².        Treatment Area:right chest wall    Patient was seen today for weekly visit.     Comfort Alteration    Fatigue: None    Nutritional Alteration  Anorexia: No   Nausea: No   Vomiting: No     Mucous Membrane Alteration  Drainage: No  Lymphedema: No    Skin Alteration   Sensation:WNL    Radiation Dermatitis:  Intact [x]     Erythema  []     Discoloration  []     Rash []     Dry desquamation  []     Moist desquamation []       Emotional  Coping: effective      Injury, potential bleeding or infection: no    Lab Results   Component Value Date    WBC 9.8 05/06/2025    HGB 12.9 05/06/2025    HCT 40.4 05/06/2025     05/06/2025         BP (!) 160/79   Pulse 88   Resp 16   Wt 92.5 kg (204 lb)   SpO2 98%   BMI 31.95 kg/m²      Pain Assessment: None - Denies Pain              Assessment/Plan: Patient was seen today for weekly visit with Dr. Boykin. She denies any issues at this time. Pt denies any pain or issues at radiation site. She moisturizes twice daily. Will continue plan of care.     Jadyn Tapia RN

## 2025-07-16 NOTE — PROGRESS NOTES
[facility]       Radiation Oncology          [address]          [place] [id number]        O: [phone]        F: [phone]       [facility]             RADIATION ONCOLOGY WEEKLY PROGRESS NOTE  Patient ID:   Negra Coburn  : 1960   MRN: [id number]    Location:  [facility] Radiation Oncology,   [address] [place] [address]   [phone]    DIAGNOSIS:  Invasive lobular carcinoma of the right breast ypT3 pN0( i+) M0 ER/GA positive HER2/henry negative      TREATMENT DETAILS:  Treatment Site: Rt CW + LN  Actual Dose: 600cGy Rt CW, 540 cGy Rt SCF  Total Planned Dose: 5000cGy Rt CW, 4500 cGy Rt SCF  Treatment Technique: 3D-CRT  Fraction Technique: Daily  Therapy imaging monitoring: KV match daily with MV ports  Concurrent Chemotherapy: None    SUBJECTIVE:   Patient seen for their weekly on treatment evaluation today.  Doing well, denies changes in her skin    OBJECTIVE:     ECO Asymptomatic    VITAL SIGNS: BP (!) 160/79   Pulse 88   Resp 16   Wt 92.5 kg (204 lb)   SpO2 98%   BMI 31.95 kg/m²   Wt Readings from Last 5 Encounters:   25 92.5 kg (204 lb)   25 91 kg (200 lb 11.2 oz)   25 89.9 kg (198 lb 3.2 oz)   25 91.6 kg (202 lb)   25 91.6 kg (202 lb)     GENERAL:  General appearance is that of a well-nourished, well-developed in no apparent distress.  HEART:  Normal rate and regular rhythm  LUNGS:  Pulmonary effort normal.  ABDOMEN:  Soft, nontender, non distended  EXTREMITIES:  No edema.  No calf tenderness.  MSK:  No spinal tenderness. Normal ROM.  NEUROLOGICAL: Alert and oriented. Strength and sensation intact bilaterally. No focal deficits.   PSYCH: Mood normal, behavior normal.      LABS:  WBC   Date Value Ref Range Status   2025 9.8 3.5 - 11.3 k/uL Final   2024 10.9 3.5 - 11.0 k/uL Final   2024 7.6 3.5 - 11.3 k/uL Final     Neutrophils Absolute   Date Value Ref Range Status

## 2025-07-17 ENCOUNTER — HOSPITAL ENCOUNTER (OUTPATIENT)
Dept: RADIATION ONCOLOGY | Age: 65
Discharge: HOME | End: 2025-07-17
Payer: COMMERCIAL

## 2025-07-17 PROCEDURE — 77412 RADIATION TX DELIVERY LVL 3: CPT | Performed by: RADIOLOGY

## 2025-07-17 PROCEDURE — 77387 GUIDANCE FOR RADJ TX DLVR: CPT | Performed by: RADIOLOGY

## 2025-07-18 ENCOUNTER — HOSPITAL ENCOUNTER (OUTPATIENT)
Dept: RADIATION ONCOLOGY | Age: 65
Discharge: HOME | End: 2025-07-18
Payer: COMMERCIAL

## 2025-07-18 PROCEDURE — 77412 RADIATION TX DELIVERY LVL 3: CPT | Performed by: RADIOLOGY

## 2025-07-18 PROCEDURE — 77387 GUIDANCE FOR RADJ TX DLVR: CPT | Performed by: RADIOLOGY

## 2025-07-21 ENCOUNTER — APPOINTMENT (OUTPATIENT)
Dept: RADIATION ONCOLOGY | Age: 65
End: 2025-07-21
Payer: COMMERCIAL

## 2025-07-22 ENCOUNTER — HOSPITAL ENCOUNTER (OUTPATIENT)
Dept: RADIATION ONCOLOGY | Age: 65
Discharge: HOME | End: 2025-07-22
Payer: COMMERCIAL

## 2025-07-22 PROCEDURE — 77412 RADIATION TX DELIVERY LVL 3: CPT | Performed by: RADIOLOGY

## 2025-07-22 PROCEDURE — 77417 THER RADIOLOGY PORT IMAGE(S): CPT | Performed by: RADIOLOGY

## 2025-07-23 ENCOUNTER — HOSPITAL ENCOUNTER (OUTPATIENT)
Dept: RADIATION ONCOLOGY | Age: 65
Discharge: HOME | End: 2025-07-23
Payer: COMMERCIAL

## 2025-07-23 VITALS
WEIGHT: 201 LBS | BODY MASS INDEX: 31.48 KG/M2 | DIASTOLIC BLOOD PRESSURE: 78 MMHG | SYSTOLIC BLOOD PRESSURE: 147 MMHG | RESPIRATION RATE: 16 BRPM | TEMPERATURE: 97.7 F | HEART RATE: 86 BPM | OXYGEN SATURATION: 98 %

## 2025-07-23 PROCEDURE — 77412 RADIATION TX DELIVERY LVL 3: CPT | Performed by: RADIOLOGY

## 2025-07-23 PROCEDURE — 77336 RADIATION PHYSICS CONSULT: CPT | Performed by: RADIOLOGY

## 2025-07-23 PROCEDURE — 77387 GUIDANCE FOR RADJ TX DLVR: CPT | Performed by: RADIOLOGY

## 2025-07-23 NOTE — PROGRESS NOTES
Negra Coburn  7/23/2025  Wt Readings from Last 3 Encounters:   07/23/25 91.2 kg (201 lb)   07/16/25 92.5 kg (204 lb)   06/26/25 91 kg (200 lb 11.2 oz)     Body mass index is 31.48 kg/m².        Treatment Area: Right chest wall/LN/SCF    Patient was seen today for weekly visit.     Comfort Alteration    Fatigue: Mild    Nutritional Alteration  Anorexia: No   Nausea: No   Vomiting: No     Mucous Membrane Alteration  Drainage: No  Lymphedema: No    Skin Alteration   Sensation: WDL    Radiation Dermatitis:  Intact [x]     Erythema  []     Discoloration  []     Rash []     Dry desquamation  []     Moist desquamation []       Emotional  Coping: effective      Injury, potential bleeding or infection: none currently    Lab Results   Component Value Date    WBC 9.8 05/06/2025    HGB 12.9 05/06/2025    HCT 40.4 05/06/2025     05/06/2025         BP (!) 147/78   Pulse 86   Temp 97.7 °F (36.5 °C) (Temporal)   Resp 16   Wt 91.2 kg (201 lb)   SpO2 98%   BMI 31.48 kg/m²      Pain Assessment: None - Denies Pain              Assessment/Plan: Patient was seen today for weekly visit.  Ambulatory with a steady gait.  Pt denies any pain at this time.  Skin is in tact and she is moisturizing twice daily.  Pt states some fatigue that started over the weekend.  She is worried she could become too fatigued in the next couple of weeks preventing her from being able to work.  Instructed to bring in paperwork just in case she needs to be off from work.  Will continue plan of care.    Ruthie Bullock RN

## 2025-07-23 NOTE — PROGRESS NOTES
[facility]       Radiation Oncology          [address]          [place] [id number]        O: [phone]        F: [phone]       [facility]             RADIATION ONCOLOGY WEEKLY PROGRESS NOTE  Patient ID:   Negra Coburn  : 1960   MRN: [id number]    Location:  [facility] Radiation Oncology,   [address] [place] [address]   [phone]    DIAGNOSIS:  Invasive lobular carcinoma of the right breast ypT3 pN0( i+) M0 ER/MN positive HER2/henry negative      TREATMENT DETAILS:  Treatment Site: Rt CW + LN  Actual Dose: 1400cGy Rt CW, 1260 cGy Rt SCF  Total Planned Dose: 5000cGy Rt CW, 4500 cGy Rt SCF  Treatment Technique: 3D-CRT  Fraction Technique: Daily  Therapy imaging monitoring: KV match daily with MV ports  Concurrent Chemotherapy: None    SUBJECTIVE:   Patient seen for their weekly on treatment evaluation today.  Reports fatigue, denies redness in the treated skin, pain in the treated area, no other complaints    OBJECTIVE:     ECO Asymptomatic    VITAL SIGNS: BP (!) 147/78   Pulse 86   Temp 97.7 °F (36.5 °C) (Temporal)   Resp 16   Wt 91.2 kg (201 lb)   SpO2 98%   BMI 31.48 kg/m²   Wt Readings from Last 5 Encounters:   25 91.2 kg (201 lb)   25 92.5 kg (204 lb)   25 91 kg (200 lb 11.2 oz)   25 89.9 kg (198 lb 3.2 oz)   25 91.6 kg (202 lb)     GENERAL:  General appearance is that of a well-nourished, well-developed in no apparent distress.  HEART:  Normal rate and regular rhythm  LUNGS:  Pulmonary effort normal.  ABDOMEN:  Soft, nontender, non distended  EXTREMITIES:  No edema.  No calf tenderness.  MSK:  No spinal tenderness. Normal ROM.  NEUROLOGICAL: Alert and oriented. Strength and sensation intact bilaterally. No focal deficits.   PSYCH: Mood normal, behavior normal.      LABS:  WBC   Date Value Ref Range Status   2025 9.8 3.5 - 11.3 k/uL Final   2024 10.9 3.5 - 11.0 k/uL Final

## 2025-07-24 ENCOUNTER — HOSPITAL ENCOUNTER (OUTPATIENT)
Dept: RADIATION ONCOLOGY | Age: 65
Discharge: HOME | End: 2025-07-24
Payer: COMMERCIAL

## 2025-07-24 PROCEDURE — 77387 GUIDANCE FOR RADJ TX DLVR: CPT | Performed by: RADIOLOGY

## 2025-07-24 PROCEDURE — 77412 RADIATION TX DELIVERY LVL 3: CPT | Performed by: RADIOLOGY

## 2025-07-25 ENCOUNTER — HOSPITAL ENCOUNTER (OUTPATIENT)
Dept: RADIATION ONCOLOGY | Age: 65
Discharge: HOME | End: 2025-07-25
Payer: COMMERCIAL

## 2025-07-25 PROCEDURE — 77387 GUIDANCE FOR RADJ TX DLVR: CPT | Performed by: RADIOLOGY

## 2025-07-25 PROCEDURE — 77412 RADIATION TX DELIVERY LVL 3: CPT | Performed by: RADIOLOGY

## 2025-07-28 ENCOUNTER — HOSPITAL ENCOUNTER (OUTPATIENT)
Dept: RADIATION ONCOLOGY | Age: 65
Discharge: HOME | End: 2025-07-28
Payer: COMMERCIAL

## 2025-07-28 PROCEDURE — 77387 GUIDANCE FOR RADJ TX DLVR: CPT | Performed by: RADIOLOGY

## 2025-07-28 PROCEDURE — 77412 RADIATION TX DELIVERY LVL 3: CPT | Performed by: RADIOLOGY

## 2025-07-29 ENCOUNTER — HOSPITAL ENCOUNTER (OUTPATIENT)
Dept: RADIATION ONCOLOGY | Age: 65
Discharge: HOME | End: 2025-07-29
Payer: COMMERCIAL

## 2025-07-29 PROCEDURE — 77417 THER RADIOLOGY PORT IMAGE(S): CPT | Performed by: RADIOLOGY

## 2025-07-29 PROCEDURE — 77412 RADIATION TX DELIVERY LVL 3: CPT | Performed by: RADIOLOGY

## 2025-07-30 ENCOUNTER — TELEPHONE (OUTPATIENT)
Dept: RADIATION ONCOLOGY | Age: 65
End: 2025-07-30

## 2025-07-30 ENCOUNTER — HOSPITAL ENCOUNTER (OUTPATIENT)
Dept: RADIATION ONCOLOGY | Age: 65
Discharge: HOME | End: 2025-07-30
Payer: COMMERCIAL

## 2025-07-30 VITALS
SYSTOLIC BLOOD PRESSURE: 131 MMHG | WEIGHT: 204 LBS | RESPIRATION RATE: 16 BRPM | OXYGEN SATURATION: 100 % | HEART RATE: 83 BPM | TEMPERATURE: 97.4 F | DIASTOLIC BLOOD PRESSURE: 78 MMHG | BODY MASS INDEX: 31.95 KG/M2

## 2025-07-30 DIAGNOSIS — Z17.0 MALIGNANT NEOPLASM OF UPPER-OUTER QUADRANT OF RIGHT BREAST IN FEMALE, ESTROGEN RECEPTOR POSITIVE (HCC): Primary | ICD-10-CM

## 2025-07-30 DIAGNOSIS — C50.411 MALIGNANT NEOPLASM OF UPPER-OUTER QUADRANT OF RIGHT BREAST IN FEMALE, ESTROGEN RECEPTOR POSITIVE (HCC): Primary | ICD-10-CM

## 2025-07-30 PROCEDURE — 77412 RADIATION TX DELIVERY LVL 3: CPT | Performed by: RADIOLOGY

## 2025-07-30 PROCEDURE — 77387 GUIDANCE FOR RADJ TX DLVR: CPT | Performed by: RADIOLOGY

## 2025-07-30 PROCEDURE — 77336 RADIATION PHYSICS CONSULT: CPT | Performed by: RADIOLOGY

## 2025-07-30 NOTE — PROGRESS NOTES
[facility]       Radiation Oncology          [address]          [place] [id number]        O: [phone]        F: [phone]       [facility]             RADIATION ONCOLOGY WEEKLY PROGRESS NOTE  Patient ID:   Negra Coburn  : 1960   MRN: [id number]    Location:  [facility] Radiation Oncology,   [address] [place] [address]   [phone]    DIAGNOSIS:  Invasive lobular carcinoma of the right breast ypT3 pN0( i+) M0 ER/LA positive HER2/henry negative      TREATMENT DETAILS:  Treatment Site: Rt CW + LN  Actual Dose: 2400cGy Rt CW, 2160 cGy Rt SCF  Total Planned Dose: 5000cGy Rt CW, 4500 cGy Rt SCF  Treatment Technique: 3D-CRT  Fraction Technique: Daily  Therapy imaging monitoring: KV match daily with MV ports  Concurrent Chemotherapy: None    SUBJECTIVE:   Patient seen for their weekly on treatment evaluation today.  Reports some fatigue, tightness in the right upper extremity, no skin breakdown in the treated breast    OBJECTIVE:     ECO Asymptomatic    VITAL SIGNS: /78   Pulse 83   Temp 97.4 °F (36.3 °C) (Temporal)   Resp 16   Wt 92.5 kg (204 lb)   SpO2 100%   BMI 31.95 kg/m²   Wt Readings from Last 5 Encounters:   25 92.5 kg (204 lb)   25 91.2 kg (201 lb)   25 92.5 kg (204 lb)   25 91 kg (200 lb 11.2 oz)   25 89.9 kg (198 lb 3.2 oz)     GENERAL:  General appearance is that of a well-nourished, well-developed in no apparent distress.  HEART:  Normal rate and regular rhythm  LUNGS:  Pulmonary effort normal.  ABDOMEN:  Soft, nontender, non distended  EXTREMITIES:  No edema.  No calf tenderness.  MSK:  No spinal tenderness. Normal ROM.  NEUROLOGICAL: Alert and oriented. Strength and sensation intact bilaterally. No focal deficits.   PSYCH: Mood normal, behavior normal.  Skin: Exam done by nursing shows minimal erythema but no desquamation    LABS:  WBC   Date Value Ref Range Status

## 2025-07-30 NOTE — PROGRESS NOTES
Negra Coburn  7/30/2025  Wt Readings from Last 3 Encounters:   07/30/25 92.5 kg (204 lb)   07/23/25 91.2 kg (201 lb)   07/16/25 92.5 kg (204 lb)     Body mass index is 31.95 kg/m².        Treatment Area: Right chest wall/LN/SCF    Patient was seen today for weekly visit.     Comfort Alteration    Fatigue: Mild    Nutritional Alteration  Anorexia: No   Nausea: No   Vomiting: No     Mucous Membrane Alteration  Drainage: No  Lymphedema: No    Skin Alteration   Sensation: WDL    Radiation Dermatitis:  Intact [x]     Erythema  []     Discoloration  []     Rash []     Dry desquamation  []     Moist desquamation []       Emotional  Coping: effective      Injury, potential bleeding or infection: none currently    Lab Results   Component Value Date    WBC 9.8 05/06/2025    HGB 12.9 05/06/2025    HCT 40.4 05/06/2025     05/06/2025         /78   Pulse 83   Temp 97.4 °F (36.3 °C) (Temporal)   Resp 16   Wt 92.5 kg (204 lb)   SpO2 100%   BMI 31.95 kg/m²      Pain Assessment: None - Denies Pain              Assessment/Plan: Patient was seen today for weekly visit.  She arrives ambulatory with steady gait.  She reports completing skin care as directed.  She  complains of occasional fatigue.  LA paperwork for intermittent \"flare ups\" to be completed by writer today. She states that she has occasional \"feeling of fullness\" in right breast.  Writer explained most likely inflammation from radiation.  Encouraged to continue gentle exercises and completing skin care as directed and gently massaging area when applying lotions.  She has appointment for [facility] on 8/15/25.  Dr. Boykin evaluated patient.  Order placed for lymphedema therapy.  Continue plan of care.    Meagan Dean RN

## 2025-07-30 NOTE — TELEPHONE ENCOUNTER
FMAL papers completed and faxed to Deborah at [phone] with confirmation of receipt received. Leave forms to be scanned to chart and originals held for patient pick-up tomorrow.

## 2025-07-31 ENCOUNTER — HOSPITAL ENCOUNTER (OUTPATIENT)
Dept: RADIATION ONCOLOGY | Age: 65
Discharge: HOME | End: 2025-07-31
Payer: COMMERCIAL

## 2025-07-31 PROCEDURE — 77387 GUIDANCE FOR RADJ TX DLVR: CPT | Performed by: RADIOLOGY

## 2025-07-31 PROCEDURE — 77412 RADIATION TX DELIVERY LVL 3: CPT | Performed by: RADIOLOGY

## 2025-08-01 ENCOUNTER — HOSPITAL ENCOUNTER (OUTPATIENT)
Dept: RADIATION ONCOLOGY | Age: 65
Discharge: HOME | End: 2025-08-01
Payer: COMMERCIAL

## 2025-08-01 PROCEDURE — 77387 GUIDANCE FOR RADJ TX DLVR: CPT | Performed by: RADIOLOGY

## 2025-08-01 PROCEDURE — 77412 RADIATION TX DELIVERY LVL 3: CPT | Performed by: RADIOLOGY

## 2025-08-04 ENCOUNTER — HOSPITAL ENCOUNTER (OUTPATIENT)
Dept: RADIATION ONCOLOGY | Age: 65
Discharge: HOME | End: 2025-08-04
Payer: COMMERCIAL

## 2025-08-04 PROCEDURE — G6002 STEREOSCOPIC X-RAY GUIDANCE: HCPCS | Performed by: RADIOLOGY

## 2025-08-04 PROCEDURE — 77412 RADIATION TX DELIVERY LVL 3: CPT | Performed by: RADIOLOGY

## 2025-08-04 PROCEDURE — 77387 GUIDANCE FOR RADJ TX DLVR: CPT | Performed by: RADIOLOGY

## 2025-08-05 ENCOUNTER — HOSPITAL ENCOUNTER (OUTPATIENT)
Dept: RADIATION ONCOLOGY | Age: 65
Discharge: HOME | End: 2025-08-05
Payer: COMMERCIAL

## 2025-08-05 PROCEDURE — 77412 RADIATION TX DELIVERY LVL 3: CPT | Performed by: RADIOLOGY

## 2025-08-05 PROCEDURE — 77417 THER RADIOLOGY PORT IMAGE(S): CPT | Performed by: RADIOLOGY

## 2025-08-06 ENCOUNTER — HOSPITAL ENCOUNTER (OUTPATIENT)
Dept: RADIATION ONCOLOGY | Age: 65
Discharge: HOME | End: 2025-08-06
Payer: COMMERCIAL

## 2025-08-06 VITALS
RESPIRATION RATE: 16 BRPM | DIASTOLIC BLOOD PRESSURE: 86 MMHG | SYSTOLIC BLOOD PRESSURE: 155 MMHG | WEIGHT: 205.5 LBS | HEART RATE: 82 BPM | TEMPERATURE: 97.9 F | OXYGEN SATURATION: 98 % | BODY MASS INDEX: 32.19 KG/M2

## 2025-08-06 PROCEDURE — 77412 RADIATION TX DELIVERY LVL 3: CPT | Performed by: RADIOLOGY

## 2025-08-06 PROCEDURE — 77336 RADIATION PHYSICS CONSULT: CPT | Performed by: RADIOLOGY

## 2025-08-06 PROCEDURE — 77387 GUIDANCE FOR RADJ TX DLVR: CPT | Performed by: RADIOLOGY

## 2025-08-07 ENCOUNTER — HOSPITAL ENCOUNTER (OUTPATIENT)
Dept: RADIATION ONCOLOGY | Age: 65
Discharge: HOME | End: 2025-08-07
Payer: COMMERCIAL

## 2025-08-07 PROCEDURE — G6002 STEREOSCOPIC X-RAY GUIDANCE: HCPCS | Performed by: RADIOLOGY

## 2025-08-07 PROCEDURE — 77412 RADIATION TX DELIVERY LVL 3: CPT | Performed by: RADIOLOGY

## 2025-08-07 PROCEDURE — 77387 GUIDANCE FOR RADJ TX DLVR: CPT | Performed by: RADIOLOGY

## 2025-08-08 ENCOUNTER — HOSPITAL ENCOUNTER (OUTPATIENT)
Dept: RADIATION ONCOLOGY | Age: 65
Discharge: HOME | End: 2025-08-08
Payer: COMMERCIAL

## 2025-08-08 PROCEDURE — G6002 STEREOSCOPIC X-RAY GUIDANCE: HCPCS | Performed by: RADIOLOGY

## 2025-08-08 PROCEDURE — 77387 GUIDANCE FOR RADJ TX DLVR: CPT | Performed by: RADIOLOGY

## 2025-08-08 PROCEDURE — 77412 RADIATION TX DELIVERY LVL 3: CPT | Performed by: RADIOLOGY

## 2025-08-11 ENCOUNTER — HOSPITAL ENCOUNTER (OUTPATIENT)
Dept: RADIATION ONCOLOGY | Age: 65
Discharge: HOME | End: 2025-08-11
Payer: COMMERCIAL

## 2025-08-11 PROCEDURE — 77387 GUIDANCE FOR RADJ TX DLVR: CPT | Performed by: RADIOLOGY

## 2025-08-11 PROCEDURE — 77412 RADIATION TX DELIVERY LVL 3: CPT | Performed by: RADIOLOGY

## 2025-08-12 ENCOUNTER — HOSPITAL ENCOUNTER (OUTPATIENT)
Dept: RADIATION ONCOLOGY | Age: 65
Discharge: HOME | End: 2025-08-12
Payer: COMMERCIAL

## 2025-08-12 PROCEDURE — 77417 THER RADIOLOGY PORT IMAGE(S): CPT | Performed by: RADIOLOGY

## 2025-08-12 PROCEDURE — 77412 RADIATION TX DELIVERY LVL 3: CPT | Performed by: RADIOLOGY

## 2025-08-13 ENCOUNTER — HOSPITAL ENCOUNTER (OUTPATIENT)
Dept: RADIATION ONCOLOGY | Age: 65
Discharge: HOME | End: 2025-08-13
Payer: COMMERCIAL

## 2025-08-13 VITALS
OXYGEN SATURATION: 97 % | WEIGHT: 204 LBS | BODY MASS INDEX: 31.95 KG/M2 | TEMPERATURE: 97.2 F | DIASTOLIC BLOOD PRESSURE: 56 MMHG | HEART RATE: 83 BPM | SYSTOLIC BLOOD PRESSURE: 145 MMHG | RESPIRATION RATE: 16 BRPM

## 2025-08-13 PROCEDURE — 77412 RADIATION TX DELIVERY LVL 3: CPT | Performed by: RADIOLOGY

## 2025-08-13 PROCEDURE — 77387 GUIDANCE FOR RADJ TX DLVR: CPT | Performed by: RADIOLOGY

## 2025-08-14 ENCOUNTER — HOSPITAL ENCOUNTER (OUTPATIENT)
Dept: RADIATION ONCOLOGY | Age: 65
Discharge: HOME | End: 2025-08-14
Payer: COMMERCIAL

## 2025-08-14 PROCEDURE — 77387 GUIDANCE FOR RADJ TX DLVR: CPT | Performed by: RADIOLOGY

## 2025-08-14 PROCEDURE — 77412 RADIATION TX DELIVERY LVL 3: CPT | Performed by: RADIOLOGY

## 2025-08-15 ENCOUNTER — TELEPHONE (OUTPATIENT)
Age: 65
End: 2025-08-15

## 2025-08-15 ENCOUNTER — HOSPITAL ENCOUNTER (OUTPATIENT)
Dept: RADIATION ONCOLOGY | Age: 65
Discharge: HOME | End: 2025-08-15
Payer: COMMERCIAL

## 2025-08-15 PROCEDURE — 77387 GUIDANCE FOR RADJ TX DLVR: CPT | Performed by: RADIOLOGY

## 2025-08-15 PROCEDURE — 77412 RADIATION TX DELIVERY LVL 3: CPT | Performed by: RADIOLOGY

## 2025-08-18 ENCOUNTER — HOSPITAL ENCOUNTER (OUTPATIENT)
Dept: RADIATION ONCOLOGY | Age: 65
Discharge: HOME | End: 2025-08-18
Payer: COMMERCIAL

## 2025-08-18 ENCOUNTER — TELEPHONE (OUTPATIENT)
Dept: RADIATION ONCOLOGY | Age: 65
End: 2025-08-18

## 2025-08-18 DIAGNOSIS — C50.411 MALIGNANT NEOPLASM OF UPPER-OUTER QUADRANT OF RIGHT BREAST IN FEMALE, ESTROGEN RECEPTOR POSITIVE (HCC): Primary | ICD-10-CM

## 2025-08-18 DIAGNOSIS — Z17.0 MALIGNANT NEOPLASM OF UPPER-OUTER QUADRANT OF RIGHT BREAST IN FEMALE, ESTROGEN RECEPTOR POSITIVE (HCC): Primary | ICD-10-CM

## 2025-08-18 PROCEDURE — 77387 GUIDANCE FOR RADJ TX DLVR: CPT | Performed by: RADIOLOGY

## 2025-08-18 PROCEDURE — 77412 RADIATION TX DELIVERY LVL 3: CPT | Performed by: RADIOLOGY

## 2025-08-18 RX ORDER — SILVER SULFADIAZINE 10 MG/G
CREAM TOPICAL
Qty: 50 G | Refills: 0 | Status: SHIPPED | OUTPATIENT
Start: 2025-08-18

## 2025-08-20 ENCOUNTER — CLINICAL DOCUMENTATION (OUTPATIENT)
Dept: RADIATION ONCOLOGY | Age: 65
End: 2025-08-20

## 2025-08-25 ENCOUNTER — TELEPHONE (OUTPATIENT)
Dept: RADIATION ONCOLOGY | Age: 65
End: 2025-08-25

## 2025-08-25 RX ORDER — SILVER SULFADIAZINE 10 MG/G
CREAM TOPICAL
Qty: 50 G | Refills: 1 | Status: SHIPPED | OUTPATIENT
Start: 2025-08-25

## 2025-08-26 ENCOUNTER — HOSPITAL ENCOUNTER (OUTPATIENT)
Age: 65
Setting detail: THERAPIES SERIES
Discharge: HOME | End: 2025-08-26
Attending: RADIOLOGY

## 2025-09-04 ENCOUNTER — OFFICE VISIT (OUTPATIENT)
Age: 65
End: 2025-09-04

## 2025-09-04 ENCOUNTER — TELEPHONE (OUTPATIENT)
Age: 65
End: 2025-09-04

## 2025-09-04 VITALS
DIASTOLIC BLOOD PRESSURE: 75 MMHG | WEIGHT: 206.8 LBS | RESPIRATION RATE: 16 BRPM | TEMPERATURE: 97.1 F | SYSTOLIC BLOOD PRESSURE: 121 MMHG | OXYGEN SATURATION: 97 % | BODY MASS INDEX: 32.39 KG/M2 | HEART RATE: 84 BPM

## 2025-09-04 DIAGNOSIS — C50.311 MALIGNANT NEOPLASM OF LOWER-INNER QUADRANT OF RIGHT BREAST OF FEMALE, ESTROGEN RECEPTOR POSITIVE (HCC): Primary | ICD-10-CM

## 2025-09-04 DIAGNOSIS — Z17.0 MALIGNANT NEOPLASM OF LOWER-INNER QUADRANT OF RIGHT BREAST OF FEMALE, ESTROGEN RECEPTOR POSITIVE (HCC): Primary | ICD-10-CM

## 2025-09-04 RX ORDER — LETROZOLE 2.5 MG/1
2.5 TABLET, FILM COATED ORAL DAILY
Qty: 90 TABLET | Refills: 3 | Status: SHIPPED | OUTPATIENT
Start: 2025-09-04

## (undated) DEVICE — GOWN,SIRUS,NONRNF,SETINSLV,XL,20/CS: Brand: MEDLINE

## (undated) DEVICE — 4-PORT MANIFOLD: Brand: NEPTUNE 2

## (undated) DEVICE — APPLIER LIG CLP M L11IN TI STR RNG HNDL FOR 20 CLP DISP

## (undated) DEVICE — CONTAINER,SPECIMEN,OR STERILE,4OZ: Brand: MEDLINE

## (undated) DEVICE — SUTURE VICRYL + SZ 3-0 L27IN ABSRB UD L26MM SH 1/2 CIR VCP416H

## (undated) DEVICE — BLADE ES ELASTOMERIC COAT INSUL DURABLE BEND UPTO 90DEG

## (undated) DEVICE — BLANKET WRM W40.2XL55.9IN IORT LO BODY + MISTRAL AIR

## (undated) DEVICE — GLOVE SURG SZ 75 CRM LTX FREE POLYISOPRENE POLYMER BEAD ANTI

## (undated) DEVICE — MARKER,SKIN,WI/RULER AND LABELS: Brand: MEDLINE

## (undated) DEVICE — PROVE COVER: Brand: UNBRANDED

## (undated) DEVICE — SYRINGE MED 30ML STD CLR PLAS LUERLOCK TIP N CTRL DISP

## (undated) DEVICE — COVER,MAYO STAND,XL,STERILE: Brand: MEDLINE

## (undated) DEVICE — SOLUTION IV 1000 ML 0.9 NACL INJ USP EXCEL PLAS CONTAINER

## (undated) DEVICE — 450 ML BOTTLE OF 0.05% CHLORHEXIDINE GLUCONATE IN 99.95% STERILE WATER FOR IRRIGATION, USP AND APPLICATOR.: Brand: IRRISEPT ANTIMICROBIAL WOUND LAVAGE

## (undated) DEVICE — SUTURE MONOCRYL SZ 3-0 L27IN ABSRB UD PS-2 3/8 CIR REV CUT NDL MCP427H

## (undated) DEVICE — SYRINGE MED 50ML LUERLOCK TIP

## (undated) DEVICE — DRESSING,GAUZE,XEROFORM,CURAD,5"X9",ST: Brand: CURAD

## (undated) DEVICE — RESERVOIR,SUCTION,100CC,SILICONE: Brand: MEDLINE

## (undated) DEVICE — DRAPE,REIN 53X77,STERILE: Brand: MEDLINE

## (undated) DEVICE — SUTURE PDS II SZ 2-0 L27IN ABSRB VLT SH L26MM 1/2 CIR Z317H

## (undated) DEVICE — SUTURE VICRYL + SZ 0 L18IN ABSRB UD L36MM CT-1 1/2 CIR VCP840D

## (undated) DEVICE — DRAIN SURG 15FR SIL RND CHN W/ TRCR FULL FLUT DBL WRP TRAD

## (undated) DEVICE — SYRINGE MED 10ML LUERLOCK TIP W/O SFTY DISP

## (undated) DEVICE — Device

## (undated) DEVICE — ELECTRODE PT RET AD L9FT HI MOIST COND ADH HYDRGEL CORDED

## (undated) DEVICE — SPONGE LAP W18XL18IN WHT COT 4 PLY FLD STRUNG RADPQ DISP ST 2 PER PACK

## (undated) DEVICE — DRAPE,T,LAPARO,TRANS,STERILE: Brand: MEDLINE

## (undated) DEVICE — SYRINGE IRRIG 60ML SFT PLIABLE BLB EZ TO GRP 1 HND USE W/

## (undated) DEVICE — TOWEL,OR,DSP,ST,BLUE,DLX,XR,4/PK,20PK/CS: Brand: MEDLINE

## (undated) DEVICE — SUTURE PERMAHAND SZ 2-0 L18IN NONABSORBABLE BLK L26MM PS 1588H

## (undated) DEVICE — PAD,ABDOMINAL,5"X9",ST,LF,25/BX: Brand: MEDLINE INDUSTRIES, INC.

## (undated) DEVICE — YANKAUER,FLEXIBLE HANDLE,REGLR CAPACITY: Brand: MEDLINE INDUSTRIES, INC.

## (undated) DEVICE — STAZ MAJOR BASIN: Brand: MEDLINE INDUSTRIES, INC.

## (undated) DEVICE — DRAPE,UTILTY,TAPE,15X26, 4EA/PK: Brand: MEDLINE

## (undated) DEVICE — SUTURE MONOCRYL SZ 3-0 L27IN ABSRB UD L24MM PS-1 3/8 CIR PRIM Y936H

## (undated) DEVICE — GLOVE SURG SZ 75 L12IN FNGR THK79MIL GRN LTX FREE

## (undated) DEVICE — GAUZE,SPONGE,FLUFF,6"X6.75",STRL,5/TRAY: Brand: MEDLINE

## (undated) DEVICE — DECANTER BAG 9": Brand: MEDLINE INDUSTRIES, INC.

## (undated) DEVICE — INTENDED FOR TISSUE SEPARATION, AND OTHER PROCEDURES THAT REQUIRE A SHARP SURGICAL BLADE TO PUNCTURE OR CUT.: Brand: BARD-PARKER ® CARBON RIB-BACK BLADES

## (undated) DEVICE — SYRINGE MED 5ML STD CLR PLAS LUERLOCK TIP N CTRL DISP

## (undated) DEVICE — GLOVE SURG SZ 8 CRM LTX FREE POLYISOPRENE POLYMER BEAD ANTI

## (undated) DEVICE — NEEDLE HYPO 25GA L1.5IN BLU POLYPR HUB S STL REG BVL STR

## (undated) DEVICE — SUTURE VICRYL SZ 2-0 L18IN ABSRB UD CT-1 L36MM 1/2 CIR J839D

## (undated) DEVICE — STAZ MAJOR PLASTIC: Brand: MEDLINE INDUSTRIES, INC.

## (undated) DEVICE — SUTURE PDS II SZ 2-0 L27IN ABSRB VLT L36MM CT-1 1/2 CIR Z339H

## (undated) DEVICE — HYPODERMIC SAFETY NEEDLE: Brand: MAGELLAN

## (undated) DEVICE — COUNTER NDL 40 COUNT HLD 70 FOAM BLK ADH W/ MAG

## (undated) DEVICE — BLADE ES L6IN ELASTOMERIC COAT INSUL DURABLE BEND UPTO

## (undated) DEVICE — NEPTUNE E-SEP 165MM SUCTION SLEEVE: Brand: NEPTUNE E-SEP

## (undated) DEVICE — SUTURE VICRYL + SZ 0 L27IN ABSRB UD L36MM CT-1 1/2 CIR VCPP41D

## (undated) DEVICE — SUTURE PERMA-HAND SZ 2-0 L30IN NONABSORBABLE BLK L26MM SH K833H

## (undated) DEVICE — SILTEX MEDIUM HEIGHT TISSUE EXPANDER STYLE 9200, 650CC: Brand: MENTOR CPX 4 BREAST TISSUE EXPANDER WITH SUTURE TABS

## (undated) DEVICE — 1LYRTR 16FR10ML 100%SILI SNAP: Brand: MEDLINE INDUSTRIES, INC.

## (undated) DEVICE — GOWN,NON-REINFORCED,3XL: Brand: MEDLINE

## (undated) DEVICE — BLADE,CARBON-STEEL,15,STRL,DISPOSABLE,TB: Brand: MEDLINE